# Patient Record
Sex: MALE | Race: WHITE | NOT HISPANIC OR LATINO | Employment: FULL TIME | RURAL
[De-identification: names, ages, dates, MRNs, and addresses within clinical notes are randomized per-mention and may not be internally consistent; named-entity substitution may affect disease eponyms.]

---

## 2022-12-08 ENCOUNTER — OFFICE VISIT (OUTPATIENT)
Dept: FAMILY MEDICINE | Facility: CLINIC | Age: 57
End: 2022-12-08
Payer: COMMERCIAL

## 2022-12-08 VITALS
HEIGHT: 69 IN | TEMPERATURE: 98 F | HEART RATE: 92 BPM | DIASTOLIC BLOOD PRESSURE: 120 MMHG | WEIGHT: 250.38 LBS | OXYGEN SATURATION: 99 % | SYSTOLIC BLOOD PRESSURE: 160 MMHG | BODY MASS INDEX: 37.08 KG/M2

## 2022-12-08 DIAGNOSIS — Z76.89 ENCOUNTER TO ESTABLISH CARE WITH NEW DOCTOR: ICD-10-CM

## 2022-12-08 DIAGNOSIS — M79.672 LEFT FOOT PAIN: Primary | ICD-10-CM

## 2022-12-08 DIAGNOSIS — Z87.39 HISTORY OF GOUT: ICD-10-CM

## 2022-12-08 DIAGNOSIS — R03.0 ELEVATED BLOOD-PRESSURE READING, WITHOUT DIAGNOSIS OF HYPERTENSION: ICD-10-CM

## 2022-12-08 PROCEDURE — 3080F PR MOST RECENT DIASTOLIC BLOOD PRESSURE >= 90 MM HG: ICD-10-PCS | Mod: ,,, | Performed by: NURSE PRACTITIONER

## 2022-12-08 PROCEDURE — 3077F PR MOST RECENT SYSTOLIC BLOOD PRESSURE >= 140 MM HG: ICD-10-PCS | Mod: ,,, | Performed by: NURSE PRACTITIONER

## 2022-12-08 PROCEDURE — 3080F DIAST BP >= 90 MM HG: CPT | Mod: ,,, | Performed by: NURSE PRACTITIONER

## 2022-12-08 PROCEDURE — 3008F PR BODY MASS INDEX (BMI) DOCUMENTED: ICD-10-PCS | Mod: ,,, | Performed by: NURSE PRACTITIONER

## 2022-12-08 PROCEDURE — 3077F SYST BP >= 140 MM HG: CPT | Mod: ,,, | Performed by: NURSE PRACTITIONER

## 2022-12-08 PROCEDURE — 99203 OFFICE O/P NEW LOW 30 MIN: CPT | Mod: ,,, | Performed by: NURSE PRACTITIONER

## 2022-12-08 PROCEDURE — 99203 PR OFFICE/OUTPT VISIT, NEW, LEVL III, 30-44 MIN: ICD-10-PCS | Mod: ,,, | Performed by: NURSE PRACTITIONER

## 2022-12-08 PROCEDURE — 1159F MED LIST DOCD IN RCRD: CPT | Mod: ,,, | Performed by: NURSE PRACTITIONER

## 2022-12-08 PROCEDURE — 3008F BODY MASS INDEX DOCD: CPT | Mod: ,,, | Performed by: NURSE PRACTITIONER

## 2022-12-08 PROCEDURE — 1159F PR MEDICATION LIST DOCUMENTED IN MEDICAL RECORD: ICD-10-PCS | Mod: ,,, | Performed by: NURSE PRACTITIONER

## 2022-12-08 PROCEDURE — 1160F RVW MEDS BY RX/DR IN RCRD: CPT | Mod: ,,, | Performed by: NURSE PRACTITIONER

## 2022-12-08 PROCEDURE — 1160F PR REVIEW ALL MEDS BY PRESCRIBER/CLIN PHARMACIST DOCUMENTED: ICD-10-PCS | Mod: ,,, | Performed by: NURSE PRACTITIONER

## 2022-12-08 RX ORDER — CLINDAMYCIN HYDROCHLORIDE 300 MG/1
300 CAPSULE ORAL 3 TIMES DAILY
Qty: 30 CAPSULE | Refills: 0 | Status: SHIPPED | OUTPATIENT
Start: 2022-12-08 | End: 2022-12-18

## 2022-12-08 RX ORDER — TRAMADOL HYDROCHLORIDE 50 MG/1
TABLET ORAL
Qty: 12 TABLET | Refills: 0 | Status: SHIPPED | OUTPATIENT
Start: 2022-12-08 | End: 2023-03-21 | Stop reason: SDUPTHER

## 2022-12-08 RX ORDER — KETOROLAC TROMETHAMINE 10 MG/1
10 TABLET, FILM COATED ORAL EVERY 6 HOURS
Qty: 20 TABLET | Refills: 0 | Status: SHIPPED | OUTPATIENT
Start: 2022-12-08 | End: 2022-12-13

## 2022-12-08 RX ORDER — DULAGLUTIDE 0.75 MG/.5ML
0.75 INJECTION, SOLUTION SUBCUTANEOUS
COMMUNITY
Start: 2022-09-07 | End: 2023-11-29

## 2022-12-08 NOTE — PROGRESS NOTES
"Subjective:       Patient ID: Davi Talamantes is a 57 y.o. male.    Chief Complaint: Foot Swelling (Left foot pain. Redness and edema. )    Presents to clinic as above. Hx of gout. No fever. No injury.    Review of Systems   Constitutional: Negative.    Respiratory: Negative.     Cardiovascular: Negative.    Musculoskeletal:  Positive for joint pain. Negative for back pain, falls, myalgias and neck pain.        Reviewed family, medical, surgical, and social history.    Objective:      BP (!) 160/120   Pulse 92   Temp 98.3 °F (36.8 °C) (Oral)   Ht 5' 9" (1.753 m)   Wt 113.6 kg (250 lb 6.4 oz)   SpO2 99%   BMI 36.98 kg/m²   Physical Exam  Vitals and nursing note reviewed.   Constitutional:       General: He is not in acute distress.     Appearance: Normal appearance. He is not ill-appearing, toxic-appearing or diaphoretic.   HENT:      Head: Normocephalic.      Mouth/Throat:      Mouth: Mucous membranes are moist.   Cardiovascular:      Rate and Rhythm: Normal rate and regular rhythm.      Heart sounds: Normal heart sounds.   Pulmonary:      Effort: Pulmonary effort is normal.      Breath sounds: Normal breath sounds.   Musculoskeletal:      Cervical back: Normal range of motion and neck supple.      Left foot: Normal range of motion. No deformity, bunion, Charcot foot, foot drop or prominent metatarsal heads.   Feet:      Left foot:      Skin integrity: Erythema and warmth present. No ulcer, blister, skin breakdown, callus, dry skin or fissure.      Toenail Condition: Left toenails are abnormally thick.      Comments: Moderate redness to lateral left foot over the 5th metatarsal. Normal pulses.  Skin:     General: Skin is warm and dry.      Capillary Refill: Capillary refill takes less than 2 seconds.   Neurological:      Mental Status: He is alert and oriented to person, place, and time.   Psychiatric:         Mood and Affect: Mood normal.         Behavior: Behavior normal.         Thought Content: Thought content " normal.         Judgment: Judgment normal.          No results found for any previous visit.      Assessment:       1. Left foot pain    2. Encounter to establish care with new doctor    3. Elevated blood-pressure reading, without diagnosis of hypertension    4. History of gout        Plan:       Left foot pain  -     X-Ray Foot Complete 3 view Left; Future; Expected date: 12/08/2022  -     ketorolac (TORADOL) 10 mg tablet; Take 1 tablet (10 mg total) by mouth every 6 (six) hours. for 5 days  Dispense: 20 tablet; Refill: 0  -     traMADoL (ULTRAM) 50 mg tablet; Take 1-2 po q 6 hours prn left foot pain.  Dispense: 12 tablet; Refill: 0  -     clindamycin (CLEOCIN) 300 MG capsule; Take 1 capsule (300 mg total) by mouth 3 (three) times daily. for 10 days  Dispense: 30 capsule; Refill: 0    Encounter to establish care with new doctor  -     Ambulatory referral/consult to Family Practice; Future; Expected date: 12/15/2022    Elevated blood-pressure reading, without diagnosis of hypertension  -     Ambulatory referral/consult to Family Practice; Future; Expected date: 12/15/2022    History of gout  -     ketorolac (TORADOL) 10 mg tablet; Take 1 tablet (10 mg total) by mouth every 6 (six) hours. for 5 days  Dispense: 20 tablet; Refill: 0  -     traMADoL (ULTRAM) 50 mg tablet; Take 1-2 po q 6 hours prn left foot pain.  Dispense: 12 tablet; Refill: 0  -     clindamycin (CLEOCIN) 300 MG capsule; Take 1 capsule (300 mg total) by mouth 3 (three) times daily. for 10 days  Dispense: 30 capsule; Refill: 0  Covering for possible cellulitis due to hx of diabetes  Go to ER with any fever  RTC PRN          Risks, benefits, and side effects were discussed with the patient. All questions were answered to the fullest satisfaction of the patient, and pt verbalized understanding and agreement to treatment plan. Pt was to call with any new or worsening symptoms, or present to the ER.

## 2022-12-09 ENCOUNTER — TELEPHONE (OUTPATIENT)
Dept: FAMILY MEDICINE | Facility: CLINIC | Age: 57
End: 2022-12-09
Payer: COMMERCIAL

## 2022-12-09 NOTE — TELEPHONE ENCOUNTER
Identify patient by name and .results was given. Responded well and voiced understanding. ----- Message from SHIRA Corey sent at 2022 12:21 PM CST -----  Notify of arthritis and calcaneal spurs. Notify me if does not get better.

## 2023-03-21 ENCOUNTER — OFFICE VISIT (OUTPATIENT)
Dept: INTERNAL MEDICINE | Facility: CLINIC | Age: 58
End: 2023-03-21
Payer: COMMERCIAL

## 2023-03-21 VITALS
WEIGHT: 252 LBS | DIASTOLIC BLOOD PRESSURE: 90 MMHG | SYSTOLIC BLOOD PRESSURE: 148 MMHG | BODY MASS INDEX: 37.33 KG/M2 | RESPIRATION RATE: 18 BRPM | HEART RATE: 67 BPM | OXYGEN SATURATION: 99 % | TEMPERATURE: 97 F | HEIGHT: 69 IN

## 2023-03-21 DIAGNOSIS — Z76.89 ENCOUNTER TO ESTABLISH CARE WITH NEW DOCTOR: Primary | ICD-10-CM

## 2023-03-21 DIAGNOSIS — G47.33 OBSTRUCTIVE SLEEP APNEA: ICD-10-CM

## 2023-03-21 DIAGNOSIS — Z13.1 ENCOUNTER FOR SCREENING FOR DIABETES MELLITUS: ICD-10-CM

## 2023-03-21 DIAGNOSIS — E78.5 DYSLIPIDEMIA: ICD-10-CM

## 2023-03-21 DIAGNOSIS — M79.672 LEFT FOOT PAIN: ICD-10-CM

## 2023-03-21 DIAGNOSIS — E11.9 TYPE 2 DIABETES MELLITUS WITHOUT COMPLICATION, WITHOUT LONG-TERM CURRENT USE OF INSULIN: ICD-10-CM

## 2023-03-21 DIAGNOSIS — Z13.220 ENCOUNTER FOR SCREENING FOR LIPID DISORDER: ICD-10-CM

## 2023-03-21 DIAGNOSIS — Z87.39 HISTORY OF GOUT: ICD-10-CM

## 2023-03-21 DIAGNOSIS — I10 ESSENTIAL HYPERTENSION: ICD-10-CM

## 2023-03-21 PROCEDURE — 99205 PR OFFICE/OUTPT VISIT, NEW, LEVL V, 60-74 MIN: ICD-10-PCS | Mod: S$PBB,,, | Performed by: INTERNAL MEDICINE

## 2023-03-21 PROCEDURE — 3077F PR MOST RECENT SYSTOLIC BLOOD PRESSURE >= 140 MM HG: ICD-10-PCS | Mod: ,,, | Performed by: INTERNAL MEDICINE

## 2023-03-21 PROCEDURE — 3077F SYST BP >= 140 MM HG: CPT | Mod: ,,, | Performed by: INTERNAL MEDICINE

## 2023-03-21 PROCEDURE — 1159F PR MEDICATION LIST DOCUMENTED IN MEDICAL RECORD: ICD-10-PCS | Mod: ,,, | Performed by: INTERNAL MEDICINE

## 2023-03-21 PROCEDURE — 3080F DIAST BP >= 90 MM HG: CPT | Mod: ,,, | Performed by: INTERNAL MEDICINE

## 2023-03-21 PROCEDURE — 99205 OFFICE O/P NEW HI 60 MIN: CPT | Mod: S$PBB,,, | Performed by: INTERNAL MEDICINE

## 2023-03-21 PROCEDURE — 99214 OFFICE O/P EST MOD 30 MIN: CPT | Mod: PBBFAC | Performed by: INTERNAL MEDICINE

## 2023-03-21 PROCEDURE — 3008F PR BODY MASS INDEX (BMI) DOCUMENTED: ICD-10-PCS | Mod: ,,, | Performed by: INTERNAL MEDICINE

## 2023-03-21 PROCEDURE — 3008F BODY MASS INDEX DOCD: CPT | Mod: ,,, | Performed by: INTERNAL MEDICINE

## 2023-03-21 PROCEDURE — 3080F PR MOST RECENT DIASTOLIC BLOOD PRESSURE >= 90 MM HG: ICD-10-PCS | Mod: ,,, | Performed by: INTERNAL MEDICINE

## 2023-03-21 PROCEDURE — 1159F MED LIST DOCD IN RCRD: CPT | Mod: ,,, | Performed by: INTERNAL MEDICINE

## 2023-03-21 RX ORDER — OLMESARTAN MEDOXOMIL 20 MG/1
20 TABLET ORAL DAILY
Qty: 90 TABLET | Refills: 1 | Status: SHIPPED | OUTPATIENT
Start: 2023-03-21 | End: 2023-06-22 | Stop reason: SDUPTHER

## 2023-03-21 RX ORDER — LANCETS 33 GAUGE
EACH MISCELLANEOUS
COMMUNITY
Start: 2023-01-23 | End: 2024-01-02

## 2023-03-21 RX ORDER — TRAMADOL HYDROCHLORIDE 50 MG/1
50 TABLET ORAL EVERY 6 HOURS PRN
Qty: 40 TABLET | Refills: 1 | Status: SHIPPED | OUTPATIENT
Start: 2023-03-21

## 2023-03-21 RX ORDER — BLOOD-GLUCOSE METER
1 EACH MISCELLANEOUS
COMMUNITY
Start: 2022-10-15 | End: 2024-01-02

## 2023-03-21 NOTE — PROGRESS NOTES
Subjective:       Patient ID: Davi Talamantes is a 57 y.o. male.    Chief Complaint: Establish Care (Pt has a wellness form he is needing completed )    The patient is a 58 yo male that presents today to establish [facility]. 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 He has a past medical history of DM II, hypertension, dyslipidemia, LYNNE.  He takes Trulicity for his diabetes.  He states that his sugars tend to run less than 130.  He inquires to know if he can be on any oral medication.  He is having issues with the injections.  He has been on ACE-inhibitor and hydrochlorothiazide in the past for his blood pressure.  However he has not been on any medications for the last 3 months.  His blood pressure is elevated today at 148/90.  He takes his blood pressure home and states using runs around that at home as well.  He does use a CPAP at night.  He has never had a colonoscopy.  Eye exam is up-to-date.  He also complains of some arthralgias and some paresthesias in the right upper extremity that he states is secondary to an accident.  He has seen multiple doctors in the past without much relief.  Today he is resting comfortably in no distress.  He is afebrile and vital signs are stable.    Review of Systems   Constitutional:  Positive for activity change and unexpected weight change. Negative for appetite change, chills and fever.   HENT:  Negative for ear pain, hearing loss, rhinorrhea, sinus pressure/congestion, sore throat and trouble swallowing.    Eyes:  Negative for pain, discharge, redness and visual disturbance.   Respiratory:  Negative for apnea, cough, chest tightness, shortness of breath and wheezing.    Cardiovascular:  Positive for palpitations. Negative for chest pain and leg swelling.   Gastrointestinal:  Negative for abdominal pain, blood in stool, constipation, diarrhea, nausea and vomiting.   Endocrine: Negative for cold intolerance, heat intolerance, polydipsia and  polyuria.   Genitourinary:  Negative for difficulty urinating, dysuria, hematuria and urgency.   Musculoskeletal:  Positive for arthralgias and joint swelling. Negative for back pain, myalgias and neck pain.   Integumentary:  Negative for pallor and rash.   Allergic/Immunologic: Negative for frequent infections.   Neurological:  Positive for weakness. Negative for tremors, seizures and headaches.   Hematological:  Negative for adenopathy.   Psychiatric/Behavioral:  Positive for dysphoric mood. Negative for confusion and sleep disturbance. The patient is not nervous/anxious.        Objective:      Physical Exam  Vitals and nursing note reviewed.   Constitutional:       General: He is not in acute distress.     Appearance: Normal appearance. He is obese. He is not ill-appearing.   HENT:      Head: Normocephalic and atraumatic.      Right Ear: External ear normal.      Left Ear: External ear normal.      Nose: Nose normal.      Mouth/Throat:      Pharynx: Oropharynx is clear.   Eyes:      Extraocular Movements: Extraocular movements intact.      Conjunctiva/sclera: Conjunctivae normal.      Pupils: Pupils are equal, round, and reactive to light.   Neck:      Vascular: No carotid bruit.   Cardiovascular:      Rate and Rhythm: Normal rate and regular rhythm.      Pulses: Normal pulses.      Heart sounds: Normal heart sounds. No murmur heard.  Pulmonary:      Effort: No respiratory distress.      Breath sounds: Normal breath sounds. No wheezing or rales.   Abdominal:      General: Bowel sounds are normal.      Palpations: Abdomen is soft.   Musculoskeletal:         General: Normal range of motion.      Cervical back: Normal range of motion and neck supple.      Right lower leg: No edema.      Left lower leg: No edema.   Skin:     General: Skin is warm and dry.      Capillary Refill: Capillary refill takes less than 2 seconds.      Coloration: Skin is not pale.   Neurological:      General: No focal deficit present.       Mental Status: He is alert and oriented to person, place, and time.      Cranial Nerves: No cranial nerve deficit.      Motor: No weakness.      Gait: Gait normal.   Psychiatric:         Mood and Affect: Mood normal.         Judgment: Judgment normal.       Assessment:       Problem List Items Addressed This Visit          Cardiac/Vascular    Dyslipidemia    Essential hypertension       Endocrine    Type 2 diabetes mellitus without complication, without long-term current use of insulin    Relevant Orders    Microalbumin/Creatinine Ratio, Urine    TSH     Other Visit Diagnoses       Encounter to establish care with new doctor    -  Primary    Encounter for screening for diabetes mellitus        Relevant Orders    Hemoglobin A1C    Encounter for screening for lipid disorder        Relevant Orders    Lipid Panel    Left foot pain        Relevant Medications    traMADoL (ULTRAM) 50 mg tablet    Other Relevant Orders    CBC Auto Differential    Comprehensive Metabolic Panel    History of gout        Relevant Medications    traMADoL (ULTRAM) 50 mg tablet    Obstructive sleep apnea                  Plan:       1. The patient presents today to establish care.  He is never had a colonoscopy.  He is okay with us making referral for colonoscopy.  We are also going to check some lab work today.  We have discussed U/S P TF guidelines for age-appropriate screenings.  No family history of prostate cancer or colon cancer.  Based on most up-to-date guidelines and a joint shared decision and discussion we are not going to check a PSA    2. Diabetes mellitus type 2-currently only on Trulicity.  However he is having some issues tolerating this.  We are going to try Rybelsus and see if he can tolerate this.  We are going to check an A1c today, urine microalbumin.  No foot lesions.  Eye exam has been done within the last year.    3. Essential hypertension-currently not on any medications.  Blood pressure is elevated at 140 8/90.  His  target blood pressures less than 130/85.  I am going to place him on olmesartan 20 mg daily.  This will provide some kidney protection as well.  He is going to return to care in 2 weeks for blood pressure check.  We are going to check a chemistry today to see what his creatinine looks like    4. Dyslipidemia-not currently on any medications.  We are going to check a lipid panel and calculate statin needs    5. Obstructive sleep apnea-he uses CPAP at night    6. Polyarthralgias-secondary to osteoarthritis.  Tramadol p.r.n. and Voltaren gel p.r.n.    We have discussed diet and exercise.  He has a history of depression and was on Wellbutrin in the past but he is no longer on this medication.  We have discussed referral to therapist but he would like to hold off at this time.    Return to care in 3 months

## 2023-03-23 RX ORDER — ATORVASTATIN CALCIUM 20 MG/1
20 TABLET, FILM COATED ORAL DAILY
Qty: 90 TABLET | Refills: 3 | Status: SHIPPED | OUTPATIENT
Start: 2023-03-23 | End: 2023-06-22 | Stop reason: SDUPTHER

## 2023-04-03 ENCOUNTER — PATIENT MESSAGE (OUTPATIENT)
Dept: INTERNAL MEDICINE | Facility: CLINIC | Age: 58
End: 2023-04-03
Payer: COMMERCIAL

## 2023-04-12 ENCOUNTER — CLINICAL SUPPORT (OUTPATIENT)
Dept: INTERNAL MEDICINE | Facility: CLINIC | Age: 58
End: 2023-04-12
Payer: COMMERCIAL

## 2023-04-12 VITALS — SYSTOLIC BLOOD PRESSURE: 126 MMHG | DIASTOLIC BLOOD PRESSURE: 84 MMHG

## 2023-04-12 DIAGNOSIS — I10 ESSENTIAL HYPERTENSION: Primary | ICD-10-CM

## 2023-04-12 PROCEDURE — 99212 OFFICE O/P EST SF 10 MIN: CPT | Mod: PBBFAC

## 2023-04-12 RX ORDER — ORAL SEMAGLUTIDE 7 MG/1
7 TABLET ORAL DAILY
Qty: 90 TABLET | Refills: 3 | Status: SHIPPED | OUTPATIENT
Start: 2023-04-12 | End: 2023-06-22

## 2023-04-12 NOTE — PROGRESS NOTES
Pt here for bp recheck. Bp is good today Dr. Bradshaw aware. Pt advised to call us if he has any issues or needs anything. Pt verbalized understanding.

## 2023-04-12 NOTE — TELEPHONE ENCOUNTER
Pt states he is tolerating the 3mg dose of rybelsus great with no c/o. He is asking for it to be increased now.

## 2023-06-22 ENCOUNTER — OFFICE VISIT (OUTPATIENT)
Dept: INTERNAL MEDICINE | Facility: CLINIC | Age: 58
End: 2023-06-22
Payer: COMMERCIAL

## 2023-06-22 VITALS
SYSTOLIC BLOOD PRESSURE: 134 MMHG | RESPIRATION RATE: 18 BRPM | WEIGHT: 244 LBS | OXYGEN SATURATION: 99 % | TEMPERATURE: 97 F | BODY MASS INDEX: 36.14 KG/M2 | DIASTOLIC BLOOD PRESSURE: 88 MMHG | HEIGHT: 69 IN | HEART RATE: 94 BPM

## 2023-06-22 DIAGNOSIS — E78.5 DYSLIPIDEMIA: ICD-10-CM

## 2023-06-22 DIAGNOSIS — I10 ESSENTIAL HYPERTENSION: ICD-10-CM

## 2023-06-22 DIAGNOSIS — E11.9 TYPE 2 DIABETES MELLITUS WITHOUT COMPLICATION, WITHOUT LONG-TERM CURRENT USE OF INSULIN: ICD-10-CM

## 2023-06-22 DIAGNOSIS — Z09 FOLLOW-UP EXAM: Primary | ICD-10-CM

## 2023-06-22 PROCEDURE — 99214 OFFICE O/P EST MOD 30 MIN: CPT | Mod: S$PBB,,, | Performed by: INTERNAL MEDICINE

## 2023-06-22 PROCEDURE — 99214 PR OFFICE/OUTPT VISIT, EST, LEVL IV, 30-39 MIN: ICD-10-PCS | Mod: S$PBB,,, | Performed by: INTERNAL MEDICINE

## 2023-06-22 PROCEDURE — 4010F ACE/ARB THERAPY RXD/TAKEN: CPT | Mod: ,,, | Performed by: INTERNAL MEDICINE

## 2023-06-22 PROCEDURE — 3061F PR NEG MICROALBUMINURIA RESULT DOCUMENTED/REVIEW: ICD-10-PCS | Mod: ,,, | Performed by: INTERNAL MEDICINE

## 2023-06-22 PROCEDURE — 1159F PR MEDICATION LIST DOCUMENTED IN MEDICAL RECORD: ICD-10-PCS | Mod: ,,, | Performed by: INTERNAL MEDICINE

## 2023-06-22 PROCEDURE — 3075F PR MOST RECENT SYSTOLIC BLOOD PRESS GE 130-139MM HG: ICD-10-PCS | Mod: ,,, | Performed by: INTERNAL MEDICINE

## 2023-06-22 PROCEDURE — 3061F NEG MICROALBUMINURIA REV: CPT | Mod: ,,, | Performed by: INTERNAL MEDICINE

## 2023-06-22 PROCEDURE — 3079F PR MOST RECENT DIASTOLIC BLOOD PRESSURE 80-89 MM HG: ICD-10-PCS | Mod: ,,, | Performed by: INTERNAL MEDICINE

## 2023-06-22 PROCEDURE — 1159F MED LIST DOCD IN RCRD: CPT | Mod: ,,, | Performed by: INTERNAL MEDICINE

## 2023-06-22 PROCEDURE — 3008F BODY MASS INDEX DOCD: CPT | Mod: ,,, | Performed by: INTERNAL MEDICINE

## 2023-06-22 PROCEDURE — 3044F PR MOST RECENT HEMOGLOBIN A1C LEVEL <7.0%: ICD-10-PCS | Mod: ,,, | Performed by: INTERNAL MEDICINE

## 2023-06-22 PROCEDURE — 3008F PR BODY MASS INDEX (BMI) DOCUMENTED: ICD-10-PCS | Mod: ,,, | Performed by: INTERNAL MEDICINE

## 2023-06-22 PROCEDURE — 3075F SYST BP GE 130 - 139MM HG: CPT | Mod: ,,, | Performed by: INTERNAL MEDICINE

## 2023-06-22 PROCEDURE — 3044F HG A1C LEVEL LT 7.0%: CPT | Mod: ,,, | Performed by: INTERNAL MEDICINE

## 2023-06-22 PROCEDURE — 3066F PR DOCUMENTATION OF TREATMENT FOR NEPHROPATHY: ICD-10-PCS | Mod: ,,, | Performed by: INTERNAL MEDICINE

## 2023-06-22 PROCEDURE — 3066F NEPHROPATHY DOC TX: CPT | Mod: ,,, | Performed by: INTERNAL MEDICINE

## 2023-06-22 PROCEDURE — 99214 OFFICE O/P EST MOD 30 MIN: CPT | Mod: PBBFAC | Performed by: INTERNAL MEDICINE

## 2023-06-22 PROCEDURE — 4010F PR ACE/ARB THEARPY RXD/TAKEN: ICD-10-PCS | Mod: ,,, | Performed by: INTERNAL MEDICINE

## 2023-06-22 PROCEDURE — 3079F DIAST BP 80-89 MM HG: CPT | Mod: ,,, | Performed by: INTERNAL MEDICINE

## 2023-06-22 RX ORDER — BENZONATATE 100 MG/1
CAPSULE ORAL
COMMUNITY
Start: 2023-06-15

## 2023-06-22 RX ORDER — ATORVASTATIN CALCIUM 20 MG/1
20 TABLET, FILM COATED ORAL DAILY
Qty: 90 TABLET | Refills: 3 | Status: SHIPPED | OUTPATIENT
Start: 2023-06-22 | End: 2024-06-21

## 2023-06-22 RX ORDER — OLMESARTAN MEDOXOMIL 20 MG/1
20 TABLET ORAL DAILY
Qty: 90 TABLET | Refills: 3 | Status: SHIPPED | OUTPATIENT
Start: 2023-06-22 | End: 2024-06-21

## 2023-06-22 NOTE — PROGRESS NOTES
Subjective:       Patient ID: Davi Talamantes is a 57 y.o. male.    Chief Complaint: Follow-up (No complaints today)    The patient is a 58 yo male that presents today to establish care. 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  He takes Trulicity for his diabetes.  He states that his sugars tend to run less than 130.  He inquires to know if he can be on any oral medication.  He is having issues with the injections.  He has been on ACE-inhibitor and hydrochlorothiazide in the past for his blood pressure.  However he has not been on any medications for the last 3 months.  His blood pressure is elevated today at 148/90.  He takes his blood pressure home and states using runs around that at home as well.  He does use a CPAP at night.  He has never had a colonoscopy.  Eye exam is up-to-date.  He also complains of some arthralgias and some paresthesias in the right upper extremity that he states is secondary to an accident.  He has seen multiple doctors in the past without much relief.  Today he is resting comfortably in no distress.  He is afebrile and vital signs are stable.    6/22/23 Patient presents today for follow-up.  No significant changes in his health since we last saw him He reports blood pressures have been running 130s/80s since time of last visit, BP today is 134/88. He said that he has a smart watch that checks his blood pressure and blood sugars. BS having been ranging 110-190s. He was started on Rybelsus in March and says he is tolerating it well.  He is just on a 7 mg dose.  Denies any other complaints at this time. He does not appear to be in acute distress today. VS are stable.     Follow-up  Associated symptoms include arthralgias. Pertinent negatives include no abdominal pain, chest pain, chills, coughing, fever, headaches, joint swelling, myalgias, nausea, neck pain, rash, sore throat,  vomiting or weakness.   Review of Systems   Constitutional:  Negative for activity change, appetite change, chills, fever and unexpected weight change.   HENT:  Negative for ear pain, hearing loss, rhinorrhea, sinus pressure/congestion, sore throat and trouble swallowing.    Eyes:  Negative for pain, discharge, redness and visual disturbance.   Respiratory:  Negative for apnea, cough, chest tightness, shortness of breath and wheezing.    Cardiovascular:  Negative for chest pain, palpitations and leg swelling.   Gastrointestinal:  Negative for abdominal pain, blood in stool, constipation, diarrhea, nausea and vomiting.   Endocrine: Negative for cold intolerance, heat intolerance, polydipsia and polyuria.   Genitourinary:  Negative for difficulty urinating, dysuria, hematuria and urgency.   Musculoskeletal:  Positive for arthralgias. Negative for back pain, joint swelling, myalgias and neck pain.   Integumentary:  Negative for pallor and rash.   Allergic/Immunologic: Negative for frequent infections.   Neurological:  Negative for tremors, seizures, weakness and headaches.   Hematological:  Negative for adenopathy.   Psychiatric/Behavioral:  Negative for confusion, dysphoric mood and sleep disturbance. The patient is not nervous/anxious.        Objective:      Physical Exam  Vitals and nursing note reviewed.   Constitutional:       General: He is not in acute distress.     Appearance: Normal appearance. He is obese. He is not ill-appearing.   HENT:      Head: Normocephalic and atraumatic.      Right Ear: External ear normal.      Left Ear: External ear normal.      Nose: Nose normal.      Mouth/Throat:      Pharynx: Oropharynx is clear.   Eyes:      Extraocular Movements: Extraocular movements intact.      Conjunctiva/sclera: Conjunctivae normal.      Pupils: Pupils are equal, round, and reactive to light.   Neck:      Vascular: No carotid bruit.   Cardiovascular:      Rate and Rhythm: Normal rate and regular rhythm.       Pulses: Normal pulses.      Heart sounds: Normal heart sounds. No murmur heard.  Pulmonary:      Effort: No respiratory distress.      Breath sounds: Normal breath sounds. No wheezing or rales.   Abdominal:      General: Bowel sounds are normal.      Palpations: Abdomen is soft.   Musculoskeletal:         General: Normal range of motion.      Cervical back: Normal range of motion and neck supple.      Right lower leg: No edema.      Left lower leg: No edema.   Skin:     General: Skin is warm and dry.      Capillary Refill: Capillary refill takes less than 2 seconds.      Coloration: Skin is not pale.   Neurological:      General: No focal deficit present.      Mental Status: He is alert and oriented to person, place, and time.      Cranial Nerves: No cranial nerve deficit.      Motor: No weakness.      Gait: Gait normal.   Psychiatric:         Mood and Affect: Mood normal.         Judgment: Judgment normal.       Assessment:       Problem List Items Addressed This Visit    None  Visit Diagnoses       Follow-up exam    -  Primary              Plan:       1. The patient presents today for follow-up.  We have made referral for colonoscopy.  Lab work was done back in March.    2. Diabetes mellitus type 2-currently only on Trulicity.  However he is having some issues tolerating this.  We are going to try Rybelsus and see if he can tolerate this.  We are going to check an A1c today, urine microalbumin.  No foot lesions.  Eye exam has been done within the last year.  6/22 Tolerating Rybelsus well. BS ranging 110-190s. Will check A1c today.  If needed we can increase thrombosis to 14 mg    3. Essential hypertension-currently not on any medications.  Blood pressure is elevated at 140 8/90.  His target blood pressures less than 130/85.  I am going to place him on olmesartan 20 mg daily.  This will provide some kidney protection as well.  He is going to return to care in 2 weeks for blood pressure check.  We are going to  check a chemistry today to see what his creatinine looks like  6/22 BP is 134/88 today.  We will continue with current care    4. Dyslipidemia-not currently on any medications.  We are going to check a lipid panel and calculate statin needs.  6/22 He is now on Atorvastatin 20mg.  This was started following his last visit    5. Obstructive sleep apnea-he uses CPAP at night.    6. Polyarthralgias-secondary to osteoarthritis.  Tramadol p.r.n. and Voltaren gel p.r.n.    We have discussed diet and exercise.  He has a history of depression and was on Wellbutrin in the past but he is no longer on this medication.  We have discussed referral to therapist but he would like to hold off at this time.  He is doing better from a mood standpoint.  He has also lost about 8 lb.    Return to care in 6 months

## 2023-08-22 ENCOUNTER — PATIENT MESSAGE (OUTPATIENT)
Dept: INTERNAL MEDICINE | Facility: CLINIC | Age: 58
End: 2023-08-22
Payer: COMMERCIAL

## 2023-08-23 DIAGNOSIS — R10.9 ABDOMINAL PAIN, UNSPECIFIED ABDOMINAL LOCATION: Primary | ICD-10-CM

## 2023-09-06 ENCOUNTER — HOSPITAL ENCOUNTER (OUTPATIENT)
Dept: RADIOLOGY | Facility: HOSPITAL | Age: 58
Discharge: HOME | End: 2023-09-06
Attending: INTERNAL MEDICINE
Payer: COMMERCIAL

## 2023-09-06 DIAGNOSIS — R10.9 ABDOMINAL PAIN, UNSPECIFIED ABDOMINAL LOCATION: ICD-10-CM

## 2023-09-06 PROCEDURE — 76700 US EXAM ABDOM COMPLETE: CPT | Mod: TC

## 2023-09-06 PROCEDURE — 76700 US ABDOMEN COMPLETE: ICD-10-PCS | Mod: 26,,, | Performed by: STUDENT IN AN ORGANIZED HEALTH CARE EDUCATION/TRAINING PROGRAM

## 2023-09-06 PROCEDURE — 76700 US EXAM ABDOM COMPLETE: CPT | Mod: 26,,, | Performed by: STUDENT IN AN ORGANIZED HEALTH CARE EDUCATION/TRAINING PROGRAM

## 2023-10-09 ENCOUNTER — PATIENT MESSAGE (OUTPATIENT)
Dept: INTERNAL MEDICINE | Facility: CLINIC | Age: 58
End: 2023-10-09
Payer: COMMERCIAL

## 2023-10-09 ENCOUNTER — HOSPITAL ENCOUNTER (EMERGENCY)
Facility: HOSPITAL | Age: 58
Discharge: HOME | End: 2023-10-09
Payer: COMMERCIAL

## 2023-10-09 VITALS
RESPIRATION RATE: 19 BRPM | BODY MASS INDEX: 34.66 KG/M2 | TEMPERATURE: 99 F | HEIGHT: 69 IN | SYSTOLIC BLOOD PRESSURE: 139 MMHG | WEIGHT: 234 LBS | HEART RATE: 114 BPM | DIASTOLIC BLOOD PRESSURE: 84 MMHG | OXYGEN SATURATION: 97 %

## 2023-10-09 DIAGNOSIS — M25.569 KNEE PAIN: ICD-10-CM

## 2023-10-09 DIAGNOSIS — M25.461 EFFUSION OF RIGHT KNEE: Primary | ICD-10-CM

## 2023-10-09 DIAGNOSIS — M25.469 EFFUSION OF KNEE, UNSPECIFIED LATERALITY: Primary | ICD-10-CM

## 2023-10-09 PROCEDURE — 99284 EMERGENCY DEPT VISIT MOD MDM: CPT

## 2023-10-09 PROCEDURE — 29505 APPLICATION LONG LEG SPLINT: CPT | Mod: RT

## 2023-10-09 PROCEDURE — 99283 EMERGENCY DEPT VISIT LOW MDM: CPT | Mod: ,,, | Performed by: NURSE PRACTITIONER

## 2023-10-09 PROCEDURE — 63600175 PHARM REV CODE 636 W HCPCS: Performed by: NURSE PRACTITIONER

## 2023-10-09 PROCEDURE — 96372 THER/PROPH/DIAG INJ SC/IM: CPT | Performed by: NURSE PRACTITIONER

## 2023-10-09 PROCEDURE — 99283 PR EMERGENCY DEPT VISIT,LEVEL III: ICD-10-PCS | Mod: ,,, | Performed by: NURSE PRACTITIONER

## 2023-10-09 RX ORDER — MELOXICAM 7.5 MG/1
7.5 TABLET ORAL DAILY
Qty: 30 TABLET | Refills: 0 | Status: SHIPPED | OUTPATIENT
Start: 2023-10-09 | End: 2023-11-08

## 2023-10-09 RX ORDER — KETOROLAC TROMETHAMINE 30 MG/ML
60 INJECTION, SOLUTION INTRAMUSCULAR; INTRAVENOUS
Status: COMPLETED | OUTPATIENT
Start: 2023-10-09 | End: 2023-10-09

## 2023-10-09 RX ADMIN — KETOROLAC TROMETHAMINE 60 MG: 30 INJECTION, SOLUTION INTRAMUSCULAR at 06:10

## 2023-10-09 NOTE — Clinical Note
"Davi Gamboa" Albin was seen and treated in our emergency department on 10/9/2023.  He may return to work on 10/13/2023.       If you have any questions or concerns, please don't hesitate to call.      Lolis Dennison, FNP"

## 2023-10-09 NOTE — DISCHARGE INSTRUCTIONS
Take medication as prescribed.   Follow up with Ortho clinic with Dr Asif. Call office to schedule appointment.   Wear knee immobilizer for comfort. Use crutches for non-weight bearing.    Rest, ice, and elevate right knee for comfort.   Return to Er with new or worsening symptoms.

## 2023-10-11 ENCOUNTER — OFFICE VISIT (OUTPATIENT)
Dept: ORTHOPEDICS | Facility: CLINIC | Age: 58
End: 2023-10-11
Payer: COMMERCIAL

## 2023-10-11 DIAGNOSIS — M25.561 RIGHT KNEE PAIN, UNSPECIFIED CHRONICITY: Primary | ICD-10-CM

## 2023-10-11 DIAGNOSIS — M10.9 GOUT, UNSPECIFIED CAUSE, UNSPECIFIED CHRONICITY, UNSPECIFIED SITE: ICD-10-CM

## 2023-10-11 DIAGNOSIS — M25.469 EFFUSION OF KNEE, UNSPECIFIED LATERALITY: ICD-10-CM

## 2023-10-11 PROCEDURE — 3066F NEPHROPATHY DOC TX: CPT | Mod: ,,, | Performed by: ORTHOPAEDIC SURGERY

## 2023-10-11 PROCEDURE — 3066F PR DOCUMENTATION OF TREATMENT FOR NEPHROPATHY: ICD-10-PCS | Mod: ,,, | Performed by: ORTHOPAEDIC SURGERY

## 2023-10-11 PROCEDURE — 20610 DRAIN/INJ JOINT/BURSA W/O US: CPT | Mod: S$PBB,RT,, | Performed by: ORTHOPAEDIC SURGERY

## 2023-10-11 PROCEDURE — 99204 PR OFFICE/OUTPT VISIT, NEW, LEVL IV, 45-59 MIN: ICD-10-PCS | Mod: S$PBB,25,, | Performed by: ORTHOPAEDIC SURGERY

## 2023-10-11 PROCEDURE — 3061F NEG MICROALBUMINURIA REV: CPT | Mod: ,,, | Performed by: ORTHOPAEDIC SURGERY

## 2023-10-11 PROCEDURE — 20610 PR DRAIN/INJECT LARGE JOINT/BURSA: ICD-10-PCS | Mod: S$PBB,RT,, | Performed by: ORTHOPAEDIC SURGERY

## 2023-10-11 PROCEDURE — 99999PBSHW PR PBB SHADOW TECHNICAL ONLY FILED TO HB: Mod: PBBFAC,,,

## 2023-10-11 PROCEDURE — 87075 CULTURE, ANAEROBE: ICD-10-PCS | Mod: ,,, | Performed by: CLINICAL MEDICAL LABORATORY

## 2023-10-11 PROCEDURE — 1159F PR MEDICATION LIST DOCUMENTED IN MEDICAL RECORD: ICD-10-PCS | Mod: ,,, | Performed by: ORTHOPAEDIC SURGERY

## 2023-10-11 PROCEDURE — 1160F PR REVIEW ALL MEDS BY PRESCRIBER/CLIN PHARMACIST DOCUMENTED: ICD-10-PCS | Mod: ,,, | Performed by: ORTHOPAEDIC SURGERY

## 2023-10-11 PROCEDURE — 87070 CULTURE OTHR SPECIMN AEROBIC: CPT | Mod: ,,, | Performed by: CLINICAL MEDICAL LABORATORY

## 2023-10-11 PROCEDURE — 1160F RVW MEDS BY RX/DR IN RCRD: CPT | Mod: ,,, | Performed by: ORTHOPAEDIC SURGERY

## 2023-10-11 PROCEDURE — 87070 CULTURE, BODY FLUID: ICD-10-PCS | Mod: ,,, | Performed by: CLINICAL MEDICAL LABORATORY

## 2023-10-11 PROCEDURE — 87075 CULTR BACTERIA EXCEPT BLOOD: CPT | Mod: ,,, | Performed by: CLINICAL MEDICAL LABORATORY

## 2023-10-11 PROCEDURE — 4010F PR ACE/ARB THEARPY RXD/TAKEN: ICD-10-PCS | Mod: ,,, | Performed by: ORTHOPAEDIC SURGERY

## 2023-10-11 PROCEDURE — 3061F PR NEG MICROALBUMINURIA RESULT DOCUMENTED/REVIEW: ICD-10-PCS | Mod: ,,, | Performed by: ORTHOPAEDIC SURGERY

## 2023-10-11 PROCEDURE — 20610 DRAIN/INJ JOINT/BURSA W/O US: CPT | Mod: PBBFAC | Performed by: ORTHOPAEDIC SURGERY

## 2023-10-11 PROCEDURE — 3044F HG A1C LEVEL LT 7.0%: CPT | Mod: ,,, | Performed by: ORTHOPAEDIC SURGERY

## 2023-10-11 PROCEDURE — 3044F PR MOST RECENT HEMOGLOBIN A1C LEVEL <7.0%: ICD-10-PCS | Mod: ,,, | Performed by: ORTHOPAEDIC SURGERY

## 2023-10-11 PROCEDURE — 4010F ACE/ARB THERAPY RXD/TAKEN: CPT | Mod: ,,, | Performed by: ORTHOPAEDIC SURGERY

## 2023-10-11 PROCEDURE — 1159F MED LIST DOCD IN RCRD: CPT | Mod: ,,, | Performed by: ORTHOPAEDIC SURGERY

## 2023-10-11 PROCEDURE — 99204 OFFICE O/P NEW MOD 45 MIN: CPT | Mod: S$PBB,25,, | Performed by: ORTHOPAEDIC SURGERY

## 2023-10-11 PROCEDURE — 99999PBSHW PR PBB SHADOW TECHNICAL ONLY FILED TO HB: ICD-10-PCS | Mod: PBBFAC,,,

## 2023-10-11 PROCEDURE — 99213 OFFICE O/P EST LOW 20 MIN: CPT | Mod: PBBFAC,25 | Performed by: ORTHOPAEDIC SURGERY

## 2023-10-11 RX ORDER — TRIAMCINOLONE ACETONIDE 40 MG/ML
40 INJECTION, SUSPENSION INTRA-ARTICULAR; INTRAMUSCULAR
Status: COMPLETED | OUTPATIENT
Start: 2023-10-11 | End: 2023-10-11

## 2023-10-11 RX ORDER — BUPIVACAINE HYDROCHLORIDE 2.5 MG/ML
1 INJECTION, SOLUTION INFILTRATION; PERINEURAL
Status: COMPLETED | OUTPATIENT
Start: 2023-10-11 | End: 2023-10-11

## 2023-10-11 RX ADMIN — TRIAMCINOLONE ACETONIDE 40 MG: 40 INJECTION, SUSPENSION INTRA-ARTICULAR; INTRAMUSCULAR at 03:10

## 2023-10-11 RX ADMIN — BUPIVACAINE HYDROCHLORIDE 2.5 MG: 2.5 INJECTION, SOLUTION INFILTRATION; PERINEURAL at 03:10

## 2023-10-11 NOTE — PROGRESS NOTES
CLINIC NOTE       Chief Complaint   Patient presents with    Right Knee - Pain        Davi Talamantes is a 58 y.o. male seen today for evaluation of right knee pain/swelling.  He awoke with pain and effusion in his right knee Saturday morning (4 days ago).  The next day while attempting to go to [employer] he he would severe pain and difficulty walking and driving the car.  Presented to [facility] emergency room.  X-rays there revealed a tiny osteophyte emanating from the medial femoral condyle and patella on sunrise view.  She was placed in a knee immobilizer and referred for orthopedic consultation.  He is employed as the past for the [email or web address].  He has a positive history of gout but is not taking any preventative medications.  He states he has been given a diet sheet in the past.  He is not had his serum uric acid checked recently.    History reviewed. No pertinent past medical history.  Family History   Problem Relation Age of Onset    Cancer Mother         Ovarian Cancer    Diabetes Mother     Stroke Mother     Arthritis Father     Depression Father     Alcohol abuse Maternal Grandfather     Heart disease Maternal Grandmother     Stroke Maternal Grandmother     Asthma Daughter     Birth defects Daughter         Down Syndrome w/VSD    Early death Daughter     Learning disabilities Daughter         Down Syndrome    Mental retardation Daughter     Cancer Sister     Diabetes Sister      Current Outpatient Medications on File Prior to Visit   Medication Sig Dispense Refill    atorvastatin (LIPITOR) 20 MG tablet Take 1 tablet (20 mg total) by mouth once daily. 90 tablet 3    benzonatate (TESSALON) 100 MG capsule       meloxicam (MOBIC) 7.5 MG tablet Take 1 tablet (7.5 mg total) by mouth once daily. 30 tablet 0    olmesartan (BENICAR) 20 MG tablet Take 1 tablet (20 mg total) by mouth once daily. 90 tablet 3    ONETOUCH DELICA PLUS LANCET 30 gauge Misc       ONETOUCH VERIO TEST STRIPS Strp 1 strip.      semaglutide  (RYBELSUS) 14 mg tablet Take 1 tablet (14 mg total) by mouth once daily. 90 tablet 3    traMADoL (ULTRAM) 50 mg tablet Take 1 tablet (50 mg total) by mouth every 6 (six) hours as needed for Pain. 40 tablet 1    TRULICITY 0.75 mg/0.5 mL pen injector Inject 0.75 mg into the skin every 7 days.       No current facility-administered medications on file prior to visit.       ROS     There were no vitals filed for this visit.    Past Surgical History:   Procedure Laterality Date    TONSILLECTOMY  1972        Review of patient's allergies indicates:  No Known Allergies     Ortho Exam :  Well-developed well-nourished  male no acute distress.  Alert oriented cooperative.  Neck is supple without JVD.  Breathing is regular nonlabored.  Skin is warm and dry no lesions seen.  Exam of the right knee shows large intra-articular effusion.  Knee ligaments stable clinically.  No erythema or other signs of infection.  Radiographic Examination:    Technique:    Findings:    Impression:   See Above    Assessment and Plan  Patient Active Problem List    Diagnosis Date Noted    Type 2 diabetes mellitus without complication, without long-term current use of insulin 03/21/2023    Dyslipidemia 03/21/2023    Essential hypertension 03/21/2023    Impression:  Probable gout flare-right knee   Plan: Right knee was prepped with Betadine and arthrocentesis performed yielding 35 cc of green tinged xanthochromic fluid.  1 cc eat of triamcinolone and Marcaine were injected into the knee The aspirate was sent for cell count with differential, crystal analysis, aerobic and anaerobic cultures.  He will be sent to the lab for serum uric acid level determination.  Continue Mobic 15 mg 1 p.o. q.d..  Purine diet sheet issued.  Ice therapy recommended.  Call him tomorrow with serum uric acid results.      Hari Livingston M.D.

## 2023-10-14 LAB
BACTERIA SPEC ANAEROBE CULT: NORMAL
BACTERIA SPEC BFLD CULT: NORMAL

## 2023-11-13 NOTE — ED PROVIDER NOTES
Encounter Date: 10/9/2023       History     Chief Complaint   Patient presents with    Knee Pain     Patient presents to the ER with complaint of right knee pain and edema.  Patient reports symptoms started proximally 3 days ago denies recent injury or accident.  He states the swelling makes it painful to bear weight on his right lower extremity.    The history is provided by the patient. No  was used.     Review of patient's allergies indicates:  No Known Allergies  No past medical history on file.  Past Surgical History:   Procedure Laterality Date    TONSILLECTOMY  1972     Family History   Problem Relation Age of Onset    Cancer Mother         Ovarian Cancer    Diabetes Mother     Stroke Mother     Arthritis Father     Depression Father     Alcohol abuse Maternal Grandfather     Heart disease Maternal Grandmother     Stroke Maternal Grandmother     Asthma Daughter     Birth defects Daughter         Down Syndrome w/VSD    Early death Daughter     Learning disabilities Daughter         Down Syndrome    Intellectual disability Daughter     Cancer Sister     Diabetes Sister      Social History     Tobacco Use    Smoking status: Former     Current packs/day: 0.00     Types: Cigarettes     Start date: 10/18/2002     Quit date: 2004     Years since quittin.4     Passive exposure: Never    Smokeless tobacco: Never    Tobacco comments:     Crutch when wife . Stopped cold turkey   Substance Use Topics    Alcohol use: Not Currently    Drug use: Never     Review of Systems   Constitutional:  Positive for activity change.   Musculoskeletal:  Positive for arthralgias, joint swelling and myalgias.        Right knee pain and edema   All other systems reviewed and are negative.      Physical Exam     Initial Vitals [10/09/23 1541]   BP Pulse Resp Temp SpO2   139/84 (!) 124 19 99.1 °F (37.3 °C) 97 %      MAP       --         Physical Exam    Nursing note and vitals reviewed.  Constitutional: He  appears well-developed and well-nourished.   HENT:   Head: Normocephalic.   Nose: Nose normal.   Mouth/Throat: Oropharynx is clear and moist.   Eyes: EOM are normal.   Neck:   Normal range of motion.  Cardiovascular:  Normal rate, normal heart sounds and intact distal pulses.           Pulmonary/Chest: Breath sounds normal.   Abdominal: Bowel sounds are normal.   Musculoskeletal:         General: Tenderness and edema present.      Cervical back: Normal range of motion.      Comments: Right knee edema limited range of motion due to pain     Neurological: He is alert and oriented to person, place, and time. He has normal strength. GCS score is 15. GCS eye subscore is 4. GCS verbal subscore is 5. GCS motor subscore is 6.   Skin: Skin is warm and dry. Capillary refill takes less than 2 seconds.   Psychiatric: He has a normal mood and affect. His behavior is normal. Judgment and thought content normal.         Medical Screening Exam   See Full Note    ED Course   Procedures  Labs Reviewed - No data to display       Imaging Results              X-Ray Knee 3 View Right (Final result)  Result time 10/09/23 16:58:13      Final result by Hari Washington MD (10/09/23 16:58:13)                   Impression:      Joint effusion.  No fracture or dislocation.      Electronically signed by: Hari Washington  Date:    10/09/2023  Time:    16:58               Narrative:    EXAMINATION:  XR KNEE 3 VIEW RIGHT    CLINICAL HISTORY:  Pain in unspecified knee    TECHNIQUE:  AP, lateral, and Merchant views of the right knee were performed.    COMPARISON:  None    FINDINGS:  No fracture.  No dislocation.  There is a suprapatellar joint effusion seen.  Tricompartmental osteophyte formation.                                       Medications   ketorolac injection 60 mg (60 mg Intramuscular Given 10/9/23 4485)     Medical Decision Making  Patient presents to the ER with complaint of right knee pain and edema.  Patient reports symptoms started proximally  3 days ago denies recent injury or accident.  He states the swelling makes it painful to bear weight on his right lower extremity.      Amount and/or Complexity of Data Reviewed  Radiology: ordered. Decision-making details documented in ED Course.  Discussion of management or test interpretation with external provider(s): Toradol 60 mg IM to treat right knee pain  Knee immobilizer and crutches for comfort.  Patient was discharged home with diagnosis of effusion of right knee and knee pain.  He was given referral to follow up in orthopedic clinic with Dr. Asif.  He was instructed to call office to schedule appointment.  He was given prescription for meloxicam to take daily for inflammation and pain.  He was told to rest ice and elevate his extremity for comfort.  He was told to return to the ER with new or worsening symptoms.    Risk  Prescription drug management.                               Clinical Impression:   Final diagnoses:  [M25.569] Knee pain  [M25.461] Effusion of right knee (Primary)        ED Disposition Condition    Discharge Stable          ED Prescriptions       Medication Sig Dispense Start Date End Date Auth. Provider    meloxicam (MOBIC) 7.5 MG tablet () Take 1 tablet (7.5 mg total) by mouth once daily. 30 tablet 10/9/2023 2023 Lolis Dennison, SHIRA          Follow-up Information    None          Lolis Dennison FNP  [phone]

## 2023-12-02 ENCOUNTER — PATIENT MESSAGE (OUTPATIENT)
Dept: ADMINISTRATIVE | Facility: HOSPITAL | Age: 58
End: 2023-12-02

## 2023-12-03 DIAGNOSIS — Z12.11 SCREENING FOR COLON CANCER: ICD-10-CM

## 2024-01-02 ENCOUNTER — OFFICE VISIT (OUTPATIENT)
Dept: INTERNAL MEDICINE | Facility: CLINIC | Age: 59
End: 2024-01-02
Payer: COMMERCIAL

## 2024-01-02 VITALS
RESPIRATION RATE: 18 BRPM | HEART RATE: 102 BPM | DIASTOLIC BLOOD PRESSURE: 82 MMHG | SYSTOLIC BLOOD PRESSURE: 126 MMHG | HEIGHT: 69 IN | WEIGHT: 242 LBS | OXYGEN SATURATION: 99 % | BODY MASS INDEX: 35.84 KG/M2 | TEMPERATURE: 96 F

## 2024-01-02 DIAGNOSIS — M10.9 GOUT, UNSPECIFIED CAUSE, UNSPECIFIED CHRONICITY, UNSPECIFIED SITE: ICD-10-CM

## 2024-01-02 DIAGNOSIS — E78.5 DYSLIPIDEMIA: ICD-10-CM

## 2024-01-02 DIAGNOSIS — Z09 FOLLOW-UP EXAM: Primary | ICD-10-CM

## 2024-01-02 DIAGNOSIS — E11.9 TYPE 2 DIABETES MELLITUS WITHOUT COMPLICATION, WITHOUT LONG-TERM CURRENT USE OF INSULIN: ICD-10-CM

## 2024-01-02 DIAGNOSIS — I10 ESSENTIAL HYPERTENSION: ICD-10-CM

## 2024-01-02 DIAGNOSIS — R20.0 RIGHT SIDED NUMBNESS: ICD-10-CM

## 2024-01-02 PROCEDURE — 99999PBSHW PR PBB SHADOW TECHNICAL ONLY FILED TO HB: Mod: PBBFAC,,,

## 2024-01-02 PROCEDURE — 3074F SYST BP LT 130 MM HG: CPT | Mod: ,,, | Performed by: INTERNAL MEDICINE

## 2024-01-02 PROCEDURE — 90686 IIV4 VACC NO PRSV 0.5 ML IM: CPT | Mod: PBBFAC | Performed by: INTERNAL MEDICINE

## 2024-01-02 PROCEDURE — 96372 THER/PROPH/DIAG INJ SC/IM: CPT | Mod: PBBFAC,59 | Performed by: INTERNAL MEDICINE

## 2024-01-02 PROCEDURE — 99215 OFFICE O/P EST HI 40 MIN: CPT | Mod: PBBFAC | Performed by: INTERNAL MEDICINE

## 2024-01-02 PROCEDURE — 3079F DIAST BP 80-89 MM HG: CPT | Mod: ,,, | Performed by: INTERNAL MEDICINE

## 2024-01-02 PROCEDURE — 3008F BODY MASS INDEX DOCD: CPT | Mod: ,,, | Performed by: INTERNAL MEDICINE

## 2024-01-02 PROCEDURE — 99999PBSHW FLU VACCINE (QUAD) GREATER THAN OR EQUAL TO 3YO PRESERVATIVE FREE IM: Mod: PBBFAC,,,

## 2024-01-02 PROCEDURE — 99215 OFFICE O/P EST HI 40 MIN: CPT | Mod: S$PBB,25,, | Performed by: INTERNAL MEDICINE

## 2024-01-02 PROCEDURE — 1159F MED LIST DOCD IN RCRD: CPT | Mod: ,,, | Performed by: INTERNAL MEDICINE

## 2024-01-02 RX ORDER — ALLOPURINOL 100 MG/1
100 TABLET ORAL DAILY
Qty: 90 TABLET | Refills: 1 | Status: SHIPPED | OUTPATIENT
Start: 2024-01-02

## 2024-01-02 RX ORDER — KETOROLAC TROMETHAMINE 30 MG/ML
60 INJECTION, SOLUTION INTRAMUSCULAR; INTRAVENOUS
Status: COMPLETED | OUTPATIENT
Start: 2024-01-02 | End: 2024-01-02

## 2024-01-02 RX ADMIN — KETOROLAC TROMETHAMINE 60 MG: 30 INJECTION, SOLUTION INTRAMUSCULAR at 08:01
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
## 2024-01-03 ENCOUNTER — PATIENT MESSAGE (OUTPATIENT)
Dept: INTERNAL MEDICINE | Facility: CLINIC | Age: 59
End: 2024-01-03
Payer: COMMERCIAL

## 2024-01-03 ENCOUNTER — PATIENT MESSAGE (OUTPATIENT)
Dept: ADMINISTRATIVE | Facility: HOSPITAL | Age: 59
End: 2024-01-03

## 2024-01-08 ENCOUNTER — PATIENT MESSAGE (OUTPATIENT)
Dept: INTERNAL MEDICINE | Facility: CLINIC | Age: 59
End: 2024-01-08
Payer: COMMERCIAL

## 2024-01-08 RX ORDER — ALLOPURINOL 300 MG/1
300 TABLET ORAL DAILY
Qty: 90 TABLET | Refills: 3 | Status: SHIPPED | OUTPATIENT
Start: 2024-01-08 | End: 2025-01-07

## 2024-01-08 RX ORDER — PREDNISONE 20 MG/1
20 TABLET ORAL DAILY
Qty: 10 TABLET | Refills: 0 | Status: SHIPPED | OUTPATIENT
Start: 2024-01-08 | End: 2024-01-18

## 2024-01-09 LAB — HEMOCCULT STL QL IA: NEGATIVE

## 2024-01-26 ENCOUNTER — PATIENT OUTREACH (OUTPATIENT)
Dept: ADMINISTRATIVE | Facility: HOSPITAL | Age: 59
End: 2024-01-26

## 2024-01-31 ENCOUNTER — PATIENT MESSAGE (OUTPATIENT)
Dept: INTERNAL MEDICINE | Facility: CLINIC | Age: 59
End: 2024-01-31
Payer: COMMERCIAL

## 2024-01-31 DIAGNOSIS — M65.30 TRIGGER FINGER OF RIGHT HAND, UNSPECIFIED FINGER: Primary | ICD-10-CM

## 2024-02-13 ENCOUNTER — OFFICE VISIT (OUTPATIENT)
Dept: ORTHOPEDICS | Facility: CLINIC | Age: 59
End: 2024-02-13
Payer: COMMERCIAL

## 2024-02-13 DIAGNOSIS — M25.561 RIGHT KNEE PAIN, UNSPECIFIED CHRONICITY: Primary | ICD-10-CM

## 2024-02-13 DIAGNOSIS — M65.30 TRIGGER FINGER OF RIGHT HAND, UNSPECIFIED FINGER: ICD-10-CM

## 2024-02-13 PROCEDURE — 3044F HG A1C LEVEL LT 7.0%: CPT | Mod: ,,, | Performed by: ORTHOPAEDIC SURGERY

## 2024-02-13 PROCEDURE — 99214 OFFICE O/P EST MOD 30 MIN: CPT | Mod: S$PBB,,, | Performed by: ORTHOPAEDIC SURGERY

## 2024-02-13 PROCEDURE — 1159F MED LIST DOCD IN RCRD: CPT | Mod: ,,, | Performed by: ORTHOPAEDIC SURGERY

## 2024-02-13 PROCEDURE — 99213 OFFICE O/P EST LOW 20 MIN: CPT | Mod: PBBFAC | Performed by: ORTHOPAEDIC SURGERY

## 2024-02-13 NOTE — PATIENT INSTRUCTIONS
Your surgery is scheduled for 03/15/2025 at [facility] in [place].  Trigger finger release , right  ring        Labwork (1st floor clinic) _2/13  EKG      (2nd floor clinic)_2/13        Our office will contact you the day before surgery with your arrival time.  Do not eat or drink anything after midnight the night before surgery (this includes gum, candy, chewing tobacco, etc).  Bring all medication in their original bottles.  Bathe with Hibiclens the night or morning before your surgery.  The morning of your surgery ONLY take blood pressure, heart, acid reflux, or thyroid (if you take a morning dose) medication.  Take these medications with a sip of water.   Be sure to have stopped your blood thinner medication at the appropriate time, as instructed.  Bring your C-Pap machine if you have one.  All jewelry, piercings, or false eyelashes MUST be removed prior to surgery.

## 2024-02-13 NOTE — PROGRESS NOTES
HPI:   Davi Talamantes is a pleasant 58 y.o. patient who reports to clinic for evaluation of right hand pain.     Injury onset and description: Patient reports he has had locking in his right ring finger as well as his middle finger in his left hand.  He does have a brace he wears constantly to keep his finger from locking.   Patient's occupation:  for [employer]  This is not a work related injury.   This injury has been non-responsive to conservative care. The pain is worse with repetitive use, and strenuous activity is very difficult.    his pain improves with rest.  he rates pain as a  8/10on the Visual Analog Scale.        Patient also complains of right knee pain that has been going on for several months.    He saw Dr. DELGADO in the past and was told he had gout.   He vitaly off fluid at that visit. Patient states it was about 60 cc fluid.   He states he is working on weight loss at this time.     PAST MEDICAL HISTORY:   Past Medical History:   Diagnosis Date    Diabetes mellitus, type 2      PAST SURGICAL HISTORY:   Past Surgical History:   Procedure Laterality Date    TONSILLECTOMY  1972     MEDICATIONS:    Current Outpatient Medications:     allopurinoL (ZYLOPRIM) 100 MG tablet, Take 1 tablet (100 mg total) by mouth once daily., Disp: 90 tablet, Rfl: 1    allopurinoL (ZYLOPRIM) 300 MG tablet, Take 1 tablet (300 mg total) by mouth once daily., Disp: 90 tablet, Rfl: 3    atorvastatin (LIPITOR) 20 MG tablet, Take 1 tablet (20 mg total) by mouth once daily., Disp: 90 tablet, Rfl: 3    benzonatate (TESSALON) 100 MG capsule, , Disp: , Rfl:     olmesartan (BENICAR) 20 MG tablet, Take 1 tablet (20 mg total) by mouth once daily., Disp: 90 tablet, Rfl: 3    semaglutide (RYBELSUS) 14 mg tablet, Take 1 tablet (14 mg total) by mouth once daily., Disp: 90 tablet, Rfl: 3    traMADoL (ULTRAM) 50 mg tablet, Take 1 tablet (50 mg total) by mouth every 6 (six) hours as needed for Pain., Disp: 40 tablet, Rfl: 1  ALLERGIES:    Review of patient's allergies indicates:  No Known Allergies      PHYSICAL EXAM:  VITAL SIGNS: There were no vitals taken for this visit.  General: Well-developed well-nourished 58 y.o. malein no acute distress;Cardiovascular: Regular rhythm by palpation of distal pulse, normal color and temperature, no concerning varicosities on symptomatic side Lungs: No labored breathing or wheezing appreciated Neuro: Alert and oriented ×3 Psychiatric: well oriented to person, place and time, demonstrates normal mood and affect Skin: No rashes, lesions or ulcers, normal temperature, turgor, and texture on uninvolved extremity    General    Constitutional: He is oriented to person, place, and time. He appears well-developed and well-nourished.   HENT:   Head: Normocephalic and atraumatic.   Nose: Nose normal.   Eyes: EOM are normal. Pupils are equal, round, and reactive to light.   Cardiovascular:  Normal rate and intact distal pulses.            Pulmonary/Chest: Effort normal. No respiratory distress. He exhibits no tenderness.   Abdominal: Soft. He exhibits no distension. There is no abdominal tenderness.   Neurological: He is alert and oriented to person, place, and time. He has normal reflexes.   Psychiatric: He has a normal mood and affect. His behavior is normal. Judgment and thought content normal.           Right Knee Exam     Inspection   Swelling: present  Deformity: absent    Tenderness   The patient is tender to palpation of the medial retinaculum and medial joint line.    Crepitus   The patient has crepitus of the medial joint line and medial plica.    Range of Motion   Flexion:  abnormal     Tests   Meniscus   Ra:  Medial - positive   Ligament Examination   Lachman: normal (-1 to 2mm)   MCL - Valgus: normal (0 to 2mm)  LCL - Varus: normal  Dial Test at 30 degrees: normal (< 5 degrees)  Posterolateral Corner: unstable (>15 degrees difference)  Patella   Patellar apprehension: negative  Patellar Grind:  positive    Other   Sensation: normal    Comments:  Pain with Thessaly Maneuver    Left Knee Exam   Left knee exam is normal.    Muscle Strength   Right Lower Extremity   Quadriceps:  5/5   Hamstrin/5     Reflexes     Right Side   Achilles:  2+  Quadriceps:  2+    Vascular Exam     Right Pulses  Dorsalis Pedis:      2+  Posterior Tibial:      2+        Inspected the right hand today and there is active triggering of the ring finger of the right hand there is tenderness of the A1 pulley.  He has otherwise normal examination of the hand with no other deformity demonstrated normal neurovascular exam.    IMAGING:  No results found.      ASSESSMENT:      ICD-10-CM ICD-9-CM   1. Right knee pain, unspecified chronicity  M25.561 719.46   2. Trigger finger of right hand, unspecified finger  M65.30 727.03       PLAN:     -Findings and treatment options were discussed with the patient  -All questions answered      We are going to plan for right ring finger A1 pulley release    We have discussed risks of hand surgery which include but are not limited to nerve or blood vessel damsge  blood clots in the legs that can travel to the lungs (pulmonary embolism). Pulmonary embolism can cause shortness of breath, chest pain, and even shock. Other risks include urinary tract infection, nausea and vomiting (usually related to pain medication), chronic pain and stiffness, bleeding i nerve damage, blood vessel injury, and infection of the knee which can require re-operation. Furthermore, the risks of anesthesia include potential heart, lung, kidney, and liver damage.  he understands and is ready for the procedure.       Also discussed his right knee I went over some of these results with him today.  He he was thought to have a history of gout but no crystals were seen on his crystal analysis I am going to re-evaluate his knee at our next visit but will plan for A1 pulley release right ring finger at this time  There are no Patient  Instructions on file for this visit.  No orders of the defined types were placed in this encounter.    Procedures

## 2024-02-16 DIAGNOSIS — Z01.818 PREPROCEDURAL EXAMINATION: Primary | ICD-10-CM

## 2024-02-16 DIAGNOSIS — M65.341 TRIGGER FINGER, RIGHT RING FINGER: ICD-10-CM

## 2024-02-16 DIAGNOSIS — M65.30 TRIGGER FINGER OF RIGHT HAND, UNSPECIFIED FINGER: Primary | ICD-10-CM

## 2024-02-16 RX ORDER — MUPIROCIN 20 MG/G
OINTMENT TOPICAL
Status: CANCELLED | OUTPATIENT
Start: 2024-02-16

## 2024-02-16 RX ORDER — CEFAZOLIN SODIUM 2 G/50ML
2 SOLUTION INTRAVENOUS
Status: CANCELLED | OUTPATIENT
Start: 2024-02-16

## 2024-02-16 RX ORDER — SODIUM CHLORIDE 9 MG/ML
INJECTION, SOLUTION INTRAVENOUS CONTINUOUS
Status: CANCELLED | OUTPATIENT
Start: 2024-02-16

## 2024-03-04 ENCOUNTER — PATIENT MESSAGE (OUTPATIENT)
Dept: ORTHOPEDICS | Facility: CLINIC | Age: 59
End: 2024-03-04
Payer: COMMERCIAL

## 2024-03-05 ENCOUNTER — OFFICE VISIT (OUTPATIENT)
Dept: NEUROLOGY | Facility: CLINIC | Age: 59
End: 2024-03-05
Payer: COMMERCIAL

## 2024-03-05 VITALS
DIASTOLIC BLOOD PRESSURE: 90 MMHG | HEIGHT: 69 IN | SYSTOLIC BLOOD PRESSURE: 144 MMHG | BODY MASS INDEX: 36.73 KG/M2 | OXYGEN SATURATION: 98 % | HEART RATE: 94 BPM | WEIGHT: 248 LBS

## 2024-03-05 DIAGNOSIS — E53.8 VITAMIN B12 DEFICIENCY: ICD-10-CM

## 2024-03-05 DIAGNOSIS — E03.9 HYPOTHYROIDISM, UNSPECIFIED TYPE: ICD-10-CM

## 2024-03-05 DIAGNOSIS — M48.02 SPINAL STENOSIS, CERVICAL REGION: Primary | ICD-10-CM

## 2024-03-05 DIAGNOSIS — R20.0 RIGHT SIDED NUMBNESS: ICD-10-CM

## 2024-03-05 DIAGNOSIS — R29.818 OTHER SYMPTOMS AND SIGNS INVOLVING THE NERVOUS SYSTEM: ICD-10-CM

## 2024-03-05 PROCEDURE — 3044F HG A1C LEVEL LT 7.0%: CPT | Mod: ,,, | Performed by: NURSE PRACTITIONER

## 2024-03-05 PROCEDURE — 1160F RVW MEDS BY RX/DR IN RCRD: CPT | Mod: ,,, | Performed by: NURSE PRACTITIONER

## 2024-03-05 PROCEDURE — 99215 OFFICE O/P EST HI 40 MIN: CPT | Mod: PBBFAC | Performed by: NURSE PRACTITIONER

## 2024-03-05 PROCEDURE — 3008F BODY MASS INDEX DOCD: CPT | Mod: ,,, | Performed by: NURSE PRACTITIONER

## 2024-03-05 PROCEDURE — 3080F DIAST BP >= 90 MM HG: CPT | Mod: ,,, | Performed by: NURSE PRACTITIONER

## 2024-03-05 PROCEDURE — 1159F MED LIST DOCD IN RCRD: CPT | Mod: ,,, | Performed by: NURSE PRACTITIONER

## 2024-03-05 PROCEDURE — 99203 OFFICE O/P NEW LOW 30 MIN: CPT | Mod: S$PBB,,, | Performed by: NURSE PRACTITIONER

## 2024-03-05 PROCEDURE — 4010F ACE/ARB THERAPY RXD/TAKEN: CPT | Mod: ,,, | Performed by: NURSE PRACTITIONER

## 2024-03-05 PROCEDURE — 3077F SYST BP >= 140 MM HG: CPT | Mod: ,,, | Performed by: NURSE PRACTITIONER

## 2024-03-05 NOTE — PATIENT INSTRUCTIONS
MRI brain and cervical spine for right sided weakness and pain  Labs as ordered  Release for prior neurology evaluation including EMG/NCS from [facility] in [place]  Hold on medications pending workup  EMG/NCS of the 4 extremities with Dr. Cameron Carson
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
## 2024-03-06 DIAGNOSIS — E53.8 VITAMIN B12 DEFICIENCY: Primary | ICD-10-CM

## 2024-03-06 RX ORDER — CYANOCOBALAMIN 1000 UG/ML
1000 INJECTION, SOLUTION INTRAMUSCULAR; SUBCUTANEOUS
Qty: 3 ML | Refills: 1 | Status: SHIPPED | OUTPATIENT
Start: 2024-03-06

## 2024-03-07 ENCOUNTER — PATIENT MESSAGE (OUTPATIENT)
Dept: NEUROLOGY | Facility: CLINIC | Age: 59
End: 2024-03-07
Payer: COMMERCIAL

## 2024-03-07 ENCOUNTER — PATIENT MESSAGE (OUTPATIENT)
Dept: ORTHOPEDICS | Facility: CLINIC | Age: 59
End: 2024-03-07
Payer: COMMERCIAL

## 2024-03-11 ENCOUNTER — PATIENT MESSAGE (OUTPATIENT)
Dept: INTERNAL MEDICINE | Facility: CLINIC | Age: 59
End: 2024-03-11
Payer: COMMERCIAL

## 2024-03-11 RX ORDER — PREDNISONE 20 MG/1
20 TABLET ORAL DAILY
Qty: 10 TABLET | Refills: 0 | Status: SHIPPED | OUTPATIENT
Start: 2024-03-11 | End: 2024-03-21

## 2024-03-20 ENCOUNTER — PATIENT MESSAGE (OUTPATIENT)
Dept: INTERNAL MEDICINE | Facility: CLINIC | Age: 59
End: 2024-03-20
Payer: COMMERCIAL

## 2024-03-20 DIAGNOSIS — M79.673 HEEL PAIN, UNSPECIFIED LATERALITY: Primary | ICD-10-CM

## 2024-03-26 ENCOUNTER — PATIENT MESSAGE (OUTPATIENT)
Dept: ORTHOPEDICS | Facility: CLINIC | Age: 59
End: 2024-03-26
Payer: COMMERCIAL

## 2024-03-28 ENCOUNTER — TELEPHONE (OUTPATIENT)
Dept: NEUROLOGY | Facility: CLINIC | Age: 59
End: 2024-03-28
Payer: COMMERCIAL

## 2024-03-28 ENCOUNTER — PATIENT MESSAGE (OUTPATIENT)
Dept: NEUROLOGY | Facility: CLINIC | Age: 59
End: 2024-03-28
Payer: COMMERCIAL

## 2024-03-28 NOTE — TELEPHONE ENCOUNTER
"PT VOICED UNDERSTANDING.    ----- Message from SHIRA Mireles sent at 3/28/2024  2:06 PM CDT -----  Normal aging process changes in the brain, nothing to do to correct it, typically just good management of conditions like high blood pressure, avoiding smoking, drinking alcohol, etc.  ----- Message -----  From: Kassie Adkins MA  Sent: 3/28/2024   9:25 AM CDT  To: SHIRA Mireles    Pt said, he looked over his results on Mychart and wants to know what "global brain volume loss" means and if that's something to be concerned about.      "

## 2024-03-28 NOTE — TELEPHONE ENCOUNTER
PT VOICED UNDERSTANDING.    ----- Message from SHIRA Mireles sent at 3/26/2024  4:50 PM CDT -----  MRI brain no acute findings, no prior stroke noted.  MRI cervical spine showed multilevel degenerative changes, more at level of c3/4 and c/4 5 but no abormal cord signal appreciated.

## 2024-03-28 NOTE — TELEPHONE ENCOUNTER
PT VOICED UNDERSTANDING.    ----- Message from SHIRA Mireles sent at 3/26/2024  4:49 PM CDT -----  MRI brain no acute findings, no prior stroke noted.  MRI cervical spine showed multilevel degenerative changes, more at level of c3/4 and c/4 5 but no abormal cord signal appreciated.

## 2024-04-05 ENCOUNTER — CLINICAL SUPPORT (OUTPATIENT)
Dept: CARDIOLOGY | Facility: CLINIC | Age: 59
End: 2024-04-05
Payer: COMMERCIAL

## 2024-04-05 DIAGNOSIS — Z01.818 PREPROCEDURAL EXAMINATION: ICD-10-CM

## 2024-04-05 PROCEDURE — 93010 ELECTROCARDIOGRAM REPORT: CPT | Mod: S$PBB,,, | Performed by: HOSPITALIST

## 2024-04-05 PROCEDURE — 93005 ELECTROCARDIOGRAM TRACING: CPT | Mod: PBBFAC | Performed by: HOSPITALIST

## 2024-04-05 PROCEDURE — 99211 OFF/OP EST MAY X REQ PHY/QHP: CPT | Mod: PBBFAC

## 2024-04-09 ENCOUNTER — PATIENT MESSAGE (OUTPATIENT)
Dept: ORTHOPEDICS | Facility: CLINIC | Age: 59
End: 2024-04-09
Payer: COMMERCIAL

## 2024-04-10 ENCOUNTER — HOSPITAL ENCOUNTER (OUTPATIENT)
Facility: HOSPITAL | Age: 59
Discharge: HOME | End: 2024-04-10
Attending: ORTHOPAEDIC SURGERY
Payer: COMMERCIAL

## 2024-04-10 ENCOUNTER — ANESTHESIA (OUTPATIENT)
Dept: SURGERY | Facility: HOSPITAL | Age: 59
End: 2024-04-10
Payer: COMMERCIAL

## 2024-04-10 ENCOUNTER — ANESTHESIA EVENT (OUTPATIENT)
Dept: SURGERY | Facility: HOSPITAL | Age: 59
End: 2024-04-10
Payer: COMMERCIAL

## 2024-04-10 VITALS
TEMPERATURE: 98 F | SYSTOLIC BLOOD PRESSURE: 159 MMHG | OXYGEN SATURATION: 93 % | RESPIRATION RATE: 20 BRPM | DIASTOLIC BLOOD PRESSURE: 86 MMHG | BODY MASS INDEX: 34.59 KG/M2 | HEART RATE: 78 BPM | HEIGHT: 71 IN

## 2024-04-10 DIAGNOSIS — M65.341 TRIGGER FINGER, RIGHT RING FINGER: Primary | ICD-10-CM

## 2024-04-10 DIAGNOSIS — M65.30 TRIGGER FINGER OF RIGHT HAND, UNSPECIFIED FINGER: ICD-10-CM

## 2024-04-10 LAB — GLUCOSE SERPL-MCNC: 88 MG/DL (ref 70–105)

## 2024-04-10 PROCEDURE — 82962 GLUCOSE BLOOD TEST: CPT

## 2024-04-10 PROCEDURE — 37000009 HC ANESTHESIA EA ADD 15 MINS: Performed by: ORTHOPAEDIC SURGERY

## 2024-04-10 PROCEDURE — 63600175 PHARM REV CODE 636 W HCPCS: Performed by: NURSE ANESTHETIST, CERTIFIED REGISTERED

## 2024-04-10 PROCEDURE — 71000033 HC RECOVERY, INTIAL HOUR: Performed by: ORTHOPAEDIC SURGERY

## 2024-04-10 PROCEDURE — 25000003 PHARM REV CODE 250: Performed by: ORTHOPAEDIC SURGERY

## 2024-04-10 PROCEDURE — D9220A PRA ANESTHESIA: Mod: ANES,,, | Performed by: ANESTHESIOLOGY

## 2024-04-10 PROCEDURE — 36000707: Performed by: ORTHOPAEDIC SURGERY

## 2024-04-10 PROCEDURE — 26055 INCISE FINGER TENDON SHEATH: CPT | Mod: F8,,, | Performed by: ORTHOPAEDIC SURGERY

## 2024-04-10 PROCEDURE — 63600175 PHARM REV CODE 636 W HCPCS: Performed by: ORTHOPAEDIC SURGERY

## 2024-04-10 PROCEDURE — 36000706: Performed by: ORTHOPAEDIC SURGERY

## 2024-04-10 PROCEDURE — 25000003 PHARM REV CODE 250: Performed by: NURSE ANESTHETIST, CERTIFIED REGISTERED

## 2024-04-10 PROCEDURE — 71000015 HC POSTOP RECOV 1ST HR: Performed by: ORTHOPAEDIC SURGERY

## 2024-04-10 PROCEDURE — D9220A PRA ANESTHESIA: Mod: CRNA,,, | Performed by: NURSE ANESTHETIST, CERTIFIED REGISTERED

## 2024-04-10 PROCEDURE — 37000008 HC ANESTHESIA 1ST 15 MINUTES: Performed by: ORTHOPAEDIC SURGERY

## 2024-04-10 PROCEDURE — 27000284 HC CANNULA NASAL: Performed by: NURSE ANESTHETIST, CERTIFIED REGISTERED

## 2024-04-10 PROCEDURE — 63600175 PHARM REV CODE 636 W HCPCS: Mod: JZ,JG | Performed by: ORTHOPAEDIC SURGERY

## 2024-04-10 PROCEDURE — 27000716 HC OXISENSOR PROBE, ANY SIZE: Performed by: NURSE ANESTHETIST, CERTIFIED REGISTERED

## 2024-04-10 RX ORDER — PROPOFOL 10 MG/ML
INJECTION, EMULSION INTRAVENOUS
Status: DISCONTINUED | OUTPATIENT
Start: 2024-04-10 | End: 2024-04-10

## 2024-04-10 RX ORDER — SODIUM CHLORIDE 0.9 % (FLUSH) 0.9 %
2 SYRINGE (ML) INJECTION EVERY 8 HOURS PRN
Status: DISCONTINUED | OUTPATIENT
Start: 2024-04-10 | End: 2024-04-10 | Stop reason: HOSPADM

## 2024-04-10 RX ORDER — HYDROMORPHONE HYDROCHLORIDE 2 MG/ML
0.5 INJECTION, SOLUTION INTRAMUSCULAR; INTRAVENOUS; SUBCUTANEOUS EVERY 5 MIN PRN
Status: DISCONTINUED | OUTPATIENT
Start: 2024-04-10 | End: 2024-04-10 | Stop reason: HOSPADM

## 2024-04-10 RX ORDER — ONDANSETRON HYDROCHLORIDE 2 MG/ML
4 INJECTION, SOLUTION INTRAVENOUS DAILY PRN
Status: DISCONTINUED | OUTPATIENT
Start: 2024-04-10 | End: 2024-04-10 | Stop reason: HOSPADM

## 2024-04-10 RX ORDER — IPRATROPIUM BROMIDE AND ALBUTEROL SULFATE 2.5; .5 MG/3ML; MG/3ML
3 SOLUTION RESPIRATORY (INHALATION) ONCE AS NEEDED
Status: DISCONTINUED | OUTPATIENT
Start: 2024-04-10 | End: 2024-04-10 | Stop reason: HOSPADM

## 2024-04-10 RX ORDER — MIDAZOLAM HYDROCHLORIDE 1 MG/ML
INJECTION INTRAMUSCULAR; INTRAVENOUS
Status: DISCONTINUED | OUTPATIENT
Start: 2024-04-10 | End: 2024-04-10

## 2024-04-10 RX ORDER — MEPERIDINE HYDROCHLORIDE 25 MG/ML
25 INJECTION INTRAMUSCULAR; INTRAVENOUS; SUBCUTANEOUS ONCE AS NEEDED
Status: DISCONTINUED | OUTPATIENT
Start: 2024-04-10 | End: 2024-04-10 | Stop reason: HOSPADM

## 2024-04-10 RX ORDER — MUPIROCIN 20 MG/G
OINTMENT TOPICAL 2 TIMES DAILY
Status: CANCELLED | OUTPATIENT
Start: 2024-04-10 | End: 2024-04-13

## 2024-04-10 RX ORDER — DIPHENHYDRAMINE HYDROCHLORIDE 50 MG/ML
25 INJECTION INTRAMUSCULAR; INTRAVENOUS EVERY 6 HOURS PRN
Status: DISCONTINUED | OUTPATIENT
Start: 2024-04-10 | End: 2024-04-10 | Stop reason: HOSPADM

## 2024-04-10 RX ORDER — MORPHINE SULFATE 10 MG/ML
4 INJECTION INTRAMUSCULAR; INTRAVENOUS; SUBCUTANEOUS EVERY 5 MIN PRN
Status: DISCONTINUED | OUTPATIENT
Start: 2024-04-10 | End: 2024-04-10 | Stop reason: HOSPADM

## 2024-04-10 RX ORDER — HYDROMORPHONE HYDROCHLORIDE 2 MG/ML
1 INJECTION, SOLUTION INTRAMUSCULAR; INTRAVENOUS; SUBCUTANEOUS EVERY 4 HOURS PRN
Status: CANCELLED | OUTPATIENT
Start: 2024-04-10

## 2024-04-10 RX ORDER — BUPIVACAINE HYDROCHLORIDE 2.5 MG/ML
INJECTION, SOLUTION EPIDURAL; INFILTRATION; INTRACAUDAL
Status: DISCONTINUED | OUTPATIENT
Start: 2024-04-10 | End: 2024-04-10 | Stop reason: HOSPADM

## 2024-04-10 RX ORDER — OXYCODONE AND ACETAMINOPHEN 5; 325 MG/1; MG/1
1 TABLET ORAL EVERY 6 HOURS PRN
Qty: 6 TABLET | Refills: 0 | Status: SHIPPED | OUTPATIENT
Start: 2024-04-10

## 2024-04-10 RX ORDER — SODIUM CHLORIDE 9 MG/ML
INJECTION, SOLUTION INTRAVENOUS CONTINUOUS
Status: DISCONTINUED | OUTPATIENT
Start: 2024-04-10 | End: 2024-04-10 | Stop reason: HOSPADM

## 2024-04-10 RX ORDER — LIDOCAINE HYDROCHLORIDE 20 MG/ML
INJECTION INTRAVENOUS
Status: DISCONTINUED | OUTPATIENT
Start: 2024-04-10 | End: 2024-04-10

## 2024-04-10 RX ORDER — MUPIROCIN 20 MG/G
OINTMENT TOPICAL
Status: DISCONTINUED | OUTPATIENT
Start: 2024-04-10 | End: 2024-04-10 | Stop reason: HOSPADM

## 2024-04-10 RX ORDER — OXYCODONE HYDROCHLORIDE 5 MG/1
5 TABLET ORAL EVERY 4 HOURS PRN
Status: CANCELLED | OUTPATIENT
Start: 2024-04-10

## 2024-04-10 RX ORDER — ONDANSETRON 4 MG/1
8 TABLET, ORALLY DISINTEGRATING ORAL EVERY 8 HOURS PRN
Status: CANCELLED | OUTPATIENT
Start: 2024-04-10

## 2024-04-10 RX ORDER — FENTANYL CITRATE 50 UG/ML
INJECTION, SOLUTION INTRAMUSCULAR; INTRAVENOUS
Status: DISCONTINUED | OUTPATIENT
Start: 2024-04-10 | End: 2024-04-10

## 2024-04-10 RX ORDER — ONDANSETRON 4 MG/1
4 TABLET, ORALLY DISINTEGRATING ORAL EVERY 8 HOURS PRN
Qty: 10 TABLET | Refills: 0 | Status: SHIPPED | OUTPATIENT
Start: 2024-04-10

## 2024-04-10 RX ADMIN — MIDAZOLAM HYDROCHLORIDE 2 MG: 1 INJECTION, SOLUTION INTRAMUSCULAR; INTRAVENOUS at 03:04

## 2024-04-10 RX ADMIN — FENTANYL CITRATE 100 MCG: 50 INJECTION INTRAMUSCULAR; INTRAVENOUS at 03:04

## 2024-04-10 RX ADMIN — CEFAZOLIN 2 G: 2 INJECTION, POWDER, FOR SOLUTION INTRAMUSCULAR; INTRAVENOUS at 03:04

## 2024-04-10 RX ADMIN — LIDOCAINE HYDROCHLORIDE 200 MG: 20 INJECTION, SOLUTION INTRAVENOUS at 03:04

## 2024-04-10 RX ADMIN — SODIUM CHLORIDE: 9 INJECTION, SOLUTION INTRAVENOUS at 02:04

## 2024-04-10 RX ADMIN — PROPOFOL 50 MG: 10 INJECTION, EMULSION INTRAVENOUS at 03:04

## 2024-04-10 NOTE — ANESTHESIA PREPROCEDURE EVALUATION
04/10/2024  Davi Talamantes is a 58 y.o., male.      Pre-op Assessment    I have reviewed the Patient Summary Reports.     I have reviewed the Nursing Notes. I have reviewed the NPO Status.   I have reviewed the Medications.     Review of Systems  Anesthesia Hx:  No problems with previous Anesthesia             Denies Family Hx of Anesthesia complications.    Denies Personal Hx of Anesthesia complications.                    Social:  No Alcohol Use, Non-Smoker       Cardiovascular:  Exercise tolerance: good   Hypertension           hyperlipidemia                             Musculoskeletal:  Arthritis               Neurological:        Peripheral Neuropathy                          Endocrine:  Diabetes         Obesity / BMI > 30      Physical Exam  General: Well nourished, Cooperative and Alert    Airway:  Mallampati: II   Mouth Opening: Normal  TM Distance: Normal  Tongue: Normal  Neck ROM: Normal ROM    Dental:  Intact    Chest/Lungs:  Clear to auscultation, Normal Respiratory Rate    Heart:  Rate: Normal  Rhythm: Regular Rhythm        Chemistry        Component Value Date/Time     04/05/2024 0812    K 4.6 04/05/2024 0812     04/05/2024 0812    CO2 28 04/05/2024 0812    BUN 12 04/05/2024 0812    CREATININE 0.97 04/05/2024 0812     04/05/2024 0812        Component Value Date/Time    CALCIUM 9.5 04/05/2024 0812    ALKPHOS 70 03/05/2024 1134    AST 20 03/05/2024 1134    ALT 37 03/05/2024 1134    BILITOT 0.3 03/05/2024 1134        Lab Results   Component Value Date    WBC 7.46 03/05/2024    HGB 14.9 03/05/2024    HCT 44.3 03/05/2024     03/05/2024     No results found for this or any previous visit.      Anesthesia Plan  Type of Anesthesia, risks & benefits discussed:    Anesthesia Type: MAC, Gen Natural Airway, Regional  Intra-op Monitoring Plan: Standard ASA Monitors  Post Op Pain Control  Plan: multimodal analgesia  Induction:  IV  Informed Consent: Informed consent signed with the Patient and all parties understand the risks and agree with anesthesia plan.  All questions answered.   ASA Score: 3  Day of Surgery Review of History & Physical: H&P Update referred to the surgeon/provider.I have interviewed and examined the patient. I have reviewed the patient's H&P dated: There are no significant changes.   Anesthesia Plan Notes: ASA 3, MAC+IV regional    Ready For Surgery From Anesthesia Perspective.     .

## 2024-04-10 NOTE — H&P
H&P completed  has been reviewed, the patient has been examined and:  I concur with the findings and no changes have occurred since H&P was written.    There are no hospital problems to display for this patient.             HPI:   Davi Talamantes is a pleasant 58 y.o. patient who reports to clinic for evaluation of right hand pain.     Injury onset and description: Patient reports he has had locking in his right ring finger as well as his middle finger in his left hand.  He does have a brace he wears constantly to keep his finger from locking.   Patient's occupation:  for [employer]  This is not a work related injury.   This injury has been non-responsive to conservative care. The pain is worse with repetitive use, and strenuous activity is very difficult.    his pain improves with rest.  he rates pain as a  8/10on the Visual Analog Scale.         Patient also complains of right knee pain that has been going on for several months.    He saw Dr. DELGADO in the past and was told he had gout.   He vitaly off fluid at that visit. Patient states it was about 60 cc fluid.   He states he is working on weight loss at this time.      PAST MEDICAL HISTORY:        Past Medical History:   Diagnosis Date    Diabetes mellitus, type 2        PAST SURGICAL HISTORY:         Past Surgical History:   Procedure Laterality Date    TONSILLECTOMY   1972      MEDICATIONS:     Current Outpatient Medications:     allopurinoL (ZYLOPRIM) 100 MG tablet, Take 1 tablet (100 mg total) by mouth once daily., Disp: 90 tablet, Rfl: 1    allopurinoL (ZYLOPRIM) 300 MG tablet, Take 1 tablet (300 mg total) by mouth once daily., Disp: 90 tablet, Rfl: 3    atorvastatin (LIPITOR) 20 MG tablet, Take 1 tablet (20 mg total) by mouth once daily., Disp: 90 tablet, Rfl: 3    benzonatate (TESSALON) 100 MG capsule, , Disp: , Rfl:     olmesartan (BENICAR) 20 MG tablet, Take 1 tablet (20 mg total) by mouth once daily., Disp: 90 tablet, Rfl: 3    semaglutide  (RYBELSUS) 14 mg tablet, Take 1 tablet (14 mg total) by mouth once daily., Disp: 90 tablet, Rfl: 3    traMADoL (ULTRAM) 50 mg tablet, Take 1 tablet (50 mg total) by mouth every 6 (six) hours as needed for Pain., Disp: 40 tablet, Rfl: 1  ALLERGIES:   Review of patient's allergies indicates:  No Known Allergies        PHYSICAL EXAM:  VITAL SIGNS: There were no vitals taken for this visit.  General: Well-developed well-nourished 58 y.o. malein no acute distress;Cardiovascular: Regular rhythm by palpation of distal pulse, normal color and temperature, no concerning varicosities on symptomatic side Lungs: No labored breathing or wheezing appreciated Neuro: Alert and oriented ×3 Psychiatric: well oriented to person, place and time, demonstrates normal mood and affect Skin: No rashes, lesions or ulcers, normal temperature, turgor, and texture on uninvolved extremity     General     Constitutional: He is oriented to person, place, and time. He appears well-developed and well-nourished.   HENT:   Head: Normocephalic and atraumatic.   Nose: Nose normal.   Eyes: EOM are normal. Pupils are equal, round, and reactive to light.   Cardiovascular:  Normal rate and intact distal pulses.            Pulmonary/Chest: Effort normal. No respiratory distress. He exhibits no tenderness.   Abdominal: Soft. He exhibits no distension. There is no abdominal tenderness.   Neurological: He is alert and oriented to person, place, and time. He has normal reflexes.   Psychiatric: He has a normal mood and affect. His behavior is normal. Judgment and thought content normal.               Right Knee Exam      Inspection   Swelling: present  Deformity: absent     Tenderness   The patient is tender to palpation of the medial retinaculum and medial joint line.     Crepitus   The patient has crepitus of the medial joint line and medial plica.     Range of Motion   Flexion:  abnormal      Tests   Meniscus   Ra:  Medial - positive   Ligament  Examination   Lachman: normal (-1 to 2mm)   MCL - Valgus: normal (0 to 2mm)  LCL - Varus: normal  Dial Test at 30 degrees: normal (< 5 degrees)  Posterolateral Corner: unstable (>15 degrees difference)  Patella   Patellar apprehension: negative  Patellar Grind: positive     Other   Sensation: normal     Comments:  Pain with Thessaly Maneuver     Left Knee Exam   Left knee exam is normal.     Muscle Strength   Right Lower Extremity   Quadriceps:  5/5   Hamstrin/5      Reflexes      Right Side   Achilles:  2+  Quadriceps:  2+     Vascular Exam      Right Pulses  Dorsalis Pedis:      2+  Posterior Tibial:      2+           Inspected the right hand today and there is active triggering of the ring finger of the right hand there is tenderness of the A1 pulley.  He has otherwise normal examination of the hand with no other deformity demonstrated normal neurovascular exam.     IMAGING:  No results found.        ASSESSMENT:        ICD-10-CM ICD-9-CM   1. Right knee pain, unspecified chronicity  M25.561 719.46   2. Trigger finger of right hand, unspecified finger  M65.30 727.03         PLAN:     -Findings and treatment options were discussed with the patient  -All questions answered        We are going to plan for right ring finger A1 pulley release     We have discussed risks of hand surgery which include but are not limited to nerve or blood vessel damsge  blood clots in the legs that can travel to the lungs (pulmonary embolism). Pulmonary embolism can cause shortness of breath, chest pain, and even shock. Other risks include urinary tract infection, nausea and vomiting (usually related to pain medication), chronic pain and stiffness, bleeding i nerve damage, blood vessel injury, and infection of the knee which can require re-operation. Furthermore, the risks of anesthesia include potential heart, lung, kidney, and liver damage.  he understands and is ready for the procedure.         Also discussed his right knee I  went over some of these results with him today.  He he was thought to have a history of gout but no crystals were seen on his crystal analysis I am going to re-evaluate his knee at our next visit but will plan for A1 pulley release right ring finger at this time  There are no Patient Instructions on file for this visit.  No orders of the defined types were placed in this encounter.

## 2024-04-10 NOTE — ANESTHESIA POSTPROCEDURE EVALUATION
Anesthesia Post Evaluation    Patient: Davi Talamantes    Procedure(s) Performed: Procedure(s) (LRB):  RELEASE, TRIGGER FINGER (Right)    Final Anesthesia Type: MAC      Patient location during evaluation: PACU  Patient participation: Yes- Able to Participate  Level of consciousness: awake and alert and oriented  Post-procedure vital signs: reviewed and stable  Pain management: adequate  Airway patency: patent  LYNNE mitigation strategies: Multimodal analgesia and Use of major conduction anesthesia (spinal/epidural) or peripheral nerve block  PONV status at discharge: No PONV  Anesthetic complications: no      Cardiovascular status: hemodynamically stable  Respiratory status: unassisted and spontaneous ventilation  Hydration status: euvolemic  Follow-up needed               Vitals Value Taken Time   /81 04/10/24 1631   Temp 36.4 °C (97.6 °F) 04/10/24 1542   Pulse 73 04/10/24 1645   Resp 17 04/10/24 1610   SpO2 97 % 04/10/24 1645   Vitals shown include unvalidated device data.      Event Time   Out of Recovery 14:11:00         Pain/Chevy Score: Chevy Score: 10 (4/10/2024  4:15 PM)

## 2024-04-10 NOTE — OP NOTE
[facility] - Orthopedic Periop Services  Operative Note    Surgery Date: 4/10/2024      Surgeon(s) and Role:     * Nick Asif MD - Primary    Assistant: Juan José Keenan    Pre-op Diagnosis:   Trigger finger of right hand, ring finger    Post-op Diagnosis:   same    Procedure:  Procedure(s) (LRB):  RELEASE, TRIGGER FINGER (Right) (ring finger)    Anesthesia:  Prince Block    EBL:  1 cc    Implants: * No implants in log *    Tourniquet time: 16 mins    Complication:   none    Procedure: The patient was taken to the operating room and placedsupine.  Anesthesia was administered and pre-operative antibiotics were given.  A timeout was performed.  Patient was positioned appropriately and prepped and draped in the usual sterile fashion.        A 1.5 cm longitudinal incision was made overlying the A1 pulley of the digit.  Subcutaneous tissue was bluntly dissected to avoid injury to the digital nerves.  The A1 pulley was easily identified.  It was incised using a #15 blade scalpel along with tenotomy scissors.  Following this, he had full excursion of the tendon with no further triggering.    Any bleeding was stopped using pressure and the wound was irrigated.  The skin was closed with interrupted horizontal mattress sutures of #4-0 nylon. Sterile dressing was applied and he was returned to the Postanesthesia Care Unit in stable condition.        As the attending surgeon I was physically present for the key/critical portions of the procedure.        Disposition:awakened from anesthesia, extubated and taken to the recovery room in a stable condition, having suffered no apparent untoward event.

## 2024-04-10 NOTE — TRANSFER OF CARE
"Anesthesia Transfer of Care Note    Patient: Davi Talamantes    Procedure(s) Performed: Procedure(s) (LRB):  RELEASE, TRIGGER FINGER (Right)    Patient location: PACU    Anesthesia Type: MAC and regional    Transport from OR: Transported from OR on room air with adequate spontaneous ventilation. Continuous ECG monitoring in transport. Continuous SpO2 monitoring in transport    Post pain: adequate analgesia    Post assessment: no apparent anesthetic complications    Post vital signs: stable    Level of consciousness: sedated and responds to stimulation    Nausea/Vomiting: no nausea/vomiting    Complications: none    Transfer of care protocol was followedComments: Good SV continue, NAD, VSS, RTRN      Last vitals: Visit Vitals  BP (!) 142/69 (BP Location: Left arm, Patient Position: Lying)   Pulse 86   Temp 36.4 °C (97.6 °F) (Oral)   Resp 15   Ht 5' 11" (1.803 m)   SpO2 95%   BMI 34.59 kg/m²     "

## 2024-04-11 ENCOUNTER — PATIENT MESSAGE (OUTPATIENT)
Dept: ORTHOPEDICS | Facility: CLINIC | Age: 59
End: 2024-04-11
Payer: COMMERCIAL

## 2024-04-16 ENCOUNTER — OFFICE VISIT (OUTPATIENT)
Dept: NEUROLOGY | Facility: CLINIC | Age: 59
End: 2024-04-16
Payer: COMMERCIAL

## 2024-04-16 VITALS
HEART RATE: 100 BPM | WEIGHT: 247 LBS | OXYGEN SATURATION: 95 % | DIASTOLIC BLOOD PRESSURE: 84 MMHG | BODY MASS INDEX: 36.58 KG/M2 | SYSTOLIC BLOOD PRESSURE: 150 MMHG | HEIGHT: 69 IN

## 2024-04-16 DIAGNOSIS — M48.02 SPINAL STENOSIS, CERVICAL REGION: ICD-10-CM

## 2024-04-16 DIAGNOSIS — R20.0 RIGHT SIDED NUMBNESS: Primary | ICD-10-CM

## 2024-04-16 PROCEDURE — 99214 OFFICE O/P EST MOD 30 MIN: CPT | Mod: PBBFAC | Performed by: NURSE PRACTITIONER

## 2024-04-16 PROCEDURE — 1159F MED LIST DOCD IN RCRD: CPT | Mod: ,,, | Performed by: NURSE PRACTITIONER

## 2024-04-16 PROCEDURE — 3079F DIAST BP 80-89 MM HG: CPT | Mod: ,,, | Performed by: NURSE PRACTITIONER

## 2024-04-16 PROCEDURE — 99213 OFFICE O/P EST LOW 20 MIN: CPT | Mod: S$PBB,,, | Performed by: NURSE PRACTITIONER

## 2024-04-16 PROCEDURE — 4010F ACE/ARB THERAPY RXD/TAKEN: CPT | Mod: ,,, | Performed by: NURSE PRACTITIONER

## 2024-04-16 PROCEDURE — 1160F RVW MEDS BY RX/DR IN RCRD: CPT | Mod: ,,, | Performed by: NURSE PRACTITIONER

## 2024-04-16 PROCEDURE — 3008F BODY MASS INDEX DOCD: CPT | Mod: ,,, | Performed by: NURSE PRACTITIONER

## 2024-04-16 PROCEDURE — 3077F SYST BP >= 140 MM HG: CPT | Mod: ,,, | Performed by: NURSE PRACTITIONER

## 2024-04-16 PROCEDURE — 3044F HG A1C LEVEL LT 7.0%: CPT | Mod: ,,, | Performed by: NURSE PRACTITIONER
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
## 2024-04-16 NOTE — PATIENT INSTRUCTIONS
Reviewed recent MRI brain and cervical spine  Recommend obtain and take B12 injections as directed  Will refer to Dr. Cameron Carson for EMG/NCS of the 4 extremities

## 2024-04-16 NOTE — DISCHARGE SUMMARY
[facility] - Orthopedic Periop Services  Discharge Note  Short Stay    Procedure(s) (LRB):  RELEASE, TRIGGER FINGER (Right)      OUTCOME: Patient tolerated treatment/procedure well without complication and is now ready for discharge.    DISPOSITION: Home or Self Care    FINAL DIAGNOSIS:  Trigger finger, right ring finger    FOLLOWUP: In clinic    DISCHARGE INSTRUCTIONS:    Discharge Procedure Orders   Diet general     Keep surgical extremity elevated     Leave dressing on - Keep it clean, dry, and intact until clinic visit     Call MD for:  temperature >100.4     Call MD for:  persistent nausea and vomiting     Call MD for:  severe uncontrolled pain     Call MD for:  difficulty breathing, headache or visual disturbances     Call MD for:  redness, tenderness, or signs of infection (pain, swelling, redness, odor or green/yellow discharge around incision site)     Call MD for:  hives     Call MD for:  persistent dizziness or light-headedness     Call MD for:  extreme fatigue         Clinical Reference Documents Added to Patient Instructions         Document    TRIGGER FINGER RELEASE DISCHARGE INSTRUCTIONS (ENGLISH)            TIME SPENT ON DISCHARGE: 9 minutes

## 2024-04-24 ENCOUNTER — OFFICE VISIT (OUTPATIENT)
Dept: ORTHOPEDICS | Facility: CLINIC | Age: 59
End: 2024-04-24
Payer: COMMERCIAL

## 2024-04-24 DIAGNOSIS — M65.30 TRIGGER FINGER OF RIGHT HAND, UNSPECIFIED FINGER: Primary | ICD-10-CM

## 2024-04-24 PROCEDURE — 4010F ACE/ARB THERAPY RXD/TAKEN: CPT | Mod: ,,, | Performed by: NURSE PRACTITIONER

## 2024-04-24 PROCEDURE — 99024 POSTOP FOLLOW-UP VISIT: CPT | Mod: ,,, | Performed by: NURSE PRACTITIONER

## 2024-04-24 PROCEDURE — 3044F HG A1C LEVEL LT 7.0%: CPT | Mod: ,,, | Performed by: NURSE PRACTITIONER

## 2024-04-24 PROCEDURE — 99212 OFFICE O/P EST SF 10 MIN: CPT | Mod: PBBFAC | Performed by: NURSE PRACTITIONER

## 2024-04-24 NOTE — PROGRESS NOTES
HISTORY OF PRESENT ILLNESS:       Release, Trigger Finger - Right 4/10/2024       Pt is here today for First post-operative followup of his No surgery found.  he is doing well.  We have reviewed his findings and discussed plan of care and future treatment options, including the physical therapy plan.     2 weeks postop trigger finger release right hand, doing well.  Incision looks good today.  Sutures removed and Steri-Strips applied.  He does have slight swelling to the finger.  We discussed wound care today.  Also discuss motion of the fingers.                                                                               PHYSICAL EXAMINATION:     Incision sites were inspected today.  There is no evidence of any erythema, infection or induration  Minimal effusion is present.  Patient is neurovascularly intact.   2+ radial and ulnar pulse pulses.        Imaging:                                                     ASSESSMENT:                                                                                                                                               1. Status post above, doing well.                                                                                                                               PLAN:       Wounds were treated today and a discussion of weight-bearing status was had with the patient.    Therapy plan discussed in great detail today; all questions answered.   Home exercises were prescribed                                                                       Discuss wound care today.  No lifting over 3 him lb with the right hand. .  Keep incision clean and dry.  Return to clinic 4 weeks.                                                                       There are no Patient Instructions on file for this visit.

## 2024-05-06 ENCOUNTER — PATIENT MESSAGE (OUTPATIENT)
Dept: INTERNAL MEDICINE | Facility: CLINIC | Age: 59
End: 2024-05-06
Payer: COMMERCIAL

## 2024-05-06 DIAGNOSIS — M10.9 GOUT, UNSPECIFIED CAUSE, UNSPECIFIED CHRONICITY, UNSPECIFIED SITE: Primary | ICD-10-CM

## 2024-05-07 RX ORDER — PREDNISONE 20 MG/1
20 TABLET ORAL DAILY
Qty: 10 TABLET | Refills: 0 | Status: SHIPPED | OUTPATIENT
Start: 2024-05-07 | End: 2024-06-11

## 2024-05-13 ENCOUNTER — TELEPHONE (OUTPATIENT)
Dept: ORTHOPEDICS | Facility: CLINIC | Age: 59
End: 2024-05-13
Payer: COMMERCIAL

## 2024-05-21 ENCOUNTER — OFFICE VISIT (OUTPATIENT)
Dept: ORTHOPEDICS | Facility: CLINIC | Age: 59
End: 2024-05-21
Payer: COMMERCIAL

## 2024-05-21 DIAGNOSIS — M65.30 TRIGGER FINGER OF RIGHT HAND, UNSPECIFIED FINGER: Primary | ICD-10-CM

## 2024-05-21 LAB
OHS QRS DURATION: 86 MS
OHS QTC CALCULATION: 448 MS

## 2024-05-21 PROCEDURE — 3044F HG A1C LEVEL LT 7.0%: CPT | Mod: ,,, | Performed by: NURSE PRACTITIONER

## 2024-05-21 PROCEDURE — 99212 OFFICE O/P EST SF 10 MIN: CPT | Mod: PBBFAC | Performed by: NURSE PRACTITIONER

## 2024-05-21 PROCEDURE — 99024 POSTOP FOLLOW-UP VISIT: CPT | Mod: ,,, | Performed by: NURSE PRACTITIONER

## 2024-05-21 PROCEDURE — 4010F ACE/ARB THERAPY RXD/TAKEN: CPT | Mod: ,,, | Performed by: NURSE PRACTITIONER

## 2024-05-21 RX ORDER — NAPROXEN 500 MG/1
500 TABLET ORAL 2 TIMES DAILY WITH MEALS
Qty: 60 TABLET | Refills: 0 | Status: SHIPPED | OUTPATIENT
Start: 2024-05-21 | End: 2024-06-17

## 2024-05-21 NOTE — PROGRESS NOTES
HISTORY OF PRESENT ILLNESS:       Release, Trigger Finger - Right 4/10/2024       Pt is here today for Second post-operative followup of his No surgery found.  he is doing well.  We have reviewed his findings and discussed plan of care and future treatment options, including the physical therapy plan.       Patient is 6 weeks status post right trigger finger release, right ring finger.  His incision is well healed however he does still have some swelling around his incision.  Swelling  extends down into his finger.  He reports he has not been able to do a lot with his hand due to the swelling and tenderness.  No signs of infection, no redness.                                                                               PHYSICAL EXAMINATION:     Incision sites were inspected today.  There is no evidence of any erythema, infection or induration  Minimal effusion is present.  Patient is neurovascularly intact.   2+ radial and ulnar pulse pulses.        Imaging:         No results found.                                                                                 ASSESSMENT:                                                                                                                                               1. Status post above, doing well.                                                                                                                               PLAN:       Wounds were treated today and a discussion of weight-bearing status was had with the patient.    Therapy plan discussed in great detail today; all questions answered.   Home exercises were prescribed             We will put him on an anti-inflammatory, naproxen p.o. b.I.d. and have him massage incision.  Return to clinic with Dr. Asif in 3 weeks for recheck.                                                                                                                                   There are no Patient Instructions on file for this  visit.

## 2024-06-11 ENCOUNTER — OFFICE VISIT (OUTPATIENT)
Dept: ORTHOPEDICS | Facility: CLINIC | Age: 59
End: 2024-06-11
Payer: COMMERCIAL

## 2024-06-11 DIAGNOSIS — Z98.890 S/P TRIGGER FINGER RELEASE: Primary | ICD-10-CM

## 2024-06-11 PROCEDURE — 99024 POSTOP FOLLOW-UP VISIT: CPT | Mod: ,,, | Performed by: ORTHOPAEDIC SURGERY

## 2024-06-11 PROCEDURE — 3044F HG A1C LEVEL LT 7.0%: CPT | Mod: ,,, | Performed by: ORTHOPAEDIC SURGERY

## 2024-06-11 PROCEDURE — 1159F MED LIST DOCD IN RCRD: CPT | Mod: ,,, | Performed by: ORTHOPAEDIC SURGERY

## 2024-06-11 PROCEDURE — 99213 OFFICE O/P EST LOW 20 MIN: CPT | Mod: PBBFAC | Performed by: ORTHOPAEDIC SURGERY

## 2024-06-11 PROCEDURE — 4010F ACE/ARB THERAPY RXD/TAKEN: CPT | Mod: ,,, | Performed by: ORTHOPAEDIC SURGERY

## 2024-06-11 RX ORDER — METHYLPREDNISOLONE 4 MG/1
TABLET ORAL
Qty: 21 EACH | Refills: 0 | Status: SHIPPED | OUTPATIENT
Start: 2024-06-11 | End: 2024-07-02

## 2024-06-11 NOTE — PROGRESS NOTES
HISTORY OF PRESENT ILLNESS:       No surgery found No surgery found      Pt is here today for Second post-operative followup of his No surgery found.  he is doing well and has no complaints.      We have reviewed his findings and discussed plan of care and future treatment options, including the physical therapy plan.                                                                                    PHYSICAL EXAMINATION:     Incision sites were inspected today.  There is no evidence of any erythema, infection or induration. A large amount of swelling is present around the A1 pulley  Minimal effusion is present.  Patient is neurovascularly intact.   2+ radial and ulnar pulse pulses.        Imaging:         No results found.                                                                                 ASSESSMENT:                                                                                                                                               1. Status post above, doing well.                                                                                                                               PLAN:       Discussed injection of A1 pulley-- we are going to try oral steroids.  Voltaren recommended as well.                                                                                                                                            There are no Patient Instructions on file for this visit.

## 2024-06-17 RX ORDER — NAPROXEN 500 MG/1
500 TABLET ORAL 2 TIMES DAILY WITH MEALS
Qty: 60 TABLET | Refills: 0 | Status: SHIPPED | OUTPATIENT
Start: 2024-06-17

## 2024-06-20 ENCOUNTER — PATIENT MESSAGE (OUTPATIENT)
Dept: ORTHOPEDICS | Facility: CLINIC | Age: 59
End: 2024-06-20
Payer: COMMERCIAL

## 2024-07-11 ENCOUNTER — OFFICE VISIT (OUTPATIENT)
Dept: ORTHOPEDICS | Facility: CLINIC | Age: 59
End: 2024-07-11
Payer: COMMERCIAL

## 2024-07-11 DIAGNOSIS — Z98.890 S/P TRIGGER FINGER RELEASE: Primary | ICD-10-CM

## 2024-07-11 PROCEDURE — 99999PBSHW PR PBB SHADOW TECHNICAL ONLY FILED TO HB: Mod: PBBFAC,,,

## 2024-07-11 PROCEDURE — 99213 OFFICE O/P EST LOW 20 MIN: CPT | Mod: PBBFAC | Performed by: ORTHOPAEDIC SURGERY

## 2024-07-11 PROCEDURE — 3044F HG A1C LEVEL LT 7.0%: CPT | Mod: ,,, | Performed by: ORTHOPAEDIC SURGERY

## 2024-07-11 PROCEDURE — 4010F ACE/ARB THERAPY RXD/TAKEN: CPT | Mod: ,,, | Performed by: ORTHOPAEDIC SURGERY

## 2024-07-11 PROCEDURE — 20550 NJX 1 TENDON SHEATH/LIGAMENT: CPT | Mod: PBBFAC,LT | Performed by: ORTHOPAEDIC SURGERY

## 2024-07-11 PROCEDURE — 99999 PR PBB SHADOW E&M-EST. PATIENT-LVL III: CPT | Mod: PBBFAC,,, | Performed by: ORTHOPAEDIC SURGERY

## 2024-07-11 PROCEDURE — 1159F MED LIST DOCD IN RCRD: CPT | Mod: ,,, | Performed by: ORTHOPAEDIC SURGERY

## 2024-07-11 PROCEDURE — 99024 POSTOP FOLLOW-UP VISIT: CPT | Mod: ,,, | Performed by: ORTHOPAEDIC SURGERY

## 2024-07-11 RX ADMIN — TRIAMCINOLONE ACETONIDE 20 MG: 400 INJECTION, SUSPENSION INTRA-ARTICULAR; INTRAMUSCULAR at 03:07

## 2024-07-11 NOTE — PROGRESS NOTES
HISTORY OF PRESENT ILLNESS:       Release, Trigger Finger - Right 4/10/2024       Pt is here today for Third post-operative followup of his Release, Trigger Finger - Right.      he reports he is still having a lot of pain with flexion and extension of his fingers.   He states that his strength is not adequate.   He states he has more pain when his hand is pronated.     He also reports some tingling in this hand and wrist.     We have reviewed his findings and discussed plan of care and future treatment options, including the physical therapy plan.                                                                                    PHYSICAL EXAMINATION:     Incision sites were inspected today.  There is no evidence of any erythema, infection or induration.  Around the site of the trigger finger release he does have adhesions present just radial and ulnar to the surgical incision.  Still has tenderness but no active triggering is demonstrated  Minimal effusion is present.  Patient is neurovascularly intact.   2+ radial and ulnar pulse pulses.        Imaging:         No results found.                                                                                 ASSESSMENT:                                                                                                                                               1. Status post above, doing well.                                                                                                                               PLAN:       Adhesions are fairly significant around the site of the surgery.  I recommended an injection today to help alleviate some of the swelling and adhesions which had been present postoperatively.  Please see attached procedure note.  I will see him back in 4 weeks for recheck                                                                               There are no Patient Instructions on file for this visit.

## 2024-07-15 RX ORDER — TRIAMCINOLONE ACETONIDE 40 MG/ML
20 INJECTION, SUSPENSION INTRA-ARTICULAR; INTRAMUSCULAR
Status: DISCONTINUED | OUTPATIENT
Start: 2024-07-11 | End: 2024-07-15 | Stop reason: HOSPADM

## 2024-07-15 NOTE — PROCEDURES
Tendon Sheath    Date/Time: 7/11/2024 3:00 PM    Performed by: Nick Asif MD  Authorized by: Nick Asif MD    Consent Done?:  Yes (Verbal)  Indications:  Pain  Local anesthetic:  Bupivacaine 0.25% without epinephrine  Location:  Ring finger  Site:  L ring flexor tendon sheath  Needle size:  22 G  Approach:  Volar  Medications:  20 mg triamcinolone acetonide 40 mg/mL  Patient tolerance:  Patient tolerated the procedure well with no immediate complications

## 2024-07-29 ENCOUNTER — PATIENT MESSAGE (OUTPATIENT)
Dept: ADMINISTRATIVE | Facility: HOSPITAL | Age: 59
End: 2024-07-29

## 2024-07-29 ENCOUNTER — PATIENT MESSAGE (OUTPATIENT)
Dept: ORTHOPEDICS | Facility: CLINIC | Age: 59
End: 2024-07-29
Payer: COMMERCIAL

## 2024-07-31 ENCOUNTER — PATIENT OUTREACH (OUTPATIENT)
Facility: HOSPITAL | Age: 59
End: 2024-07-31
Payer: COMMERCIAL

## 2024-07-31 NOTE — PROGRESS NOTES
Population Health Chart Review & Patient Outreach Details    Updates Requested / Reviewed:  [x]  Care Team Updated    Health Maintenance Topics Addressed and Outreach Outcomes / Actions Taken:  Diabetic Eye Exam [x] CARLOS sent to [facility].

## 2024-07-31 NOTE — LETTER
AUTHORIZATION FOR RELEASE OF   CONFIDENTIAL INFORMATION    Dear Tremaine Perez,    We are seeing Davi Talamantes, date of birth 1965, in the clinic at [facility]. Thomas Bradshaw DO is the patient's PCP. Davi Talamantes has an outstanding lab/procedure at the time we reviewed his chart. In order to help keep his health information updated, he has authorized us to request the following medical record(s):        (  )  MAMMOGRAM                                      (  )  COLONOSCOPY      (  )  PAP SMEAR                                          (  )  OUTSIDE LAB RESULTS     (  )  DEXA SCAN                                          ( x )  EYE EXAM            (  )  FOOT EXAM                                          (  )  ENTIRE RECORD     (  )  OUTSIDE IMMUNIZATIONS                 (  )  _______________         Please fax records to Olu Stacy LPN Care Coordinator at [phone].      If you have any questions, please contact Olu at [phone].           Patient Name: Davi Talamantes  : 1965  Patient Phone #: [phone]                Davi Talamantes  MRN: [id number]  : 1965  Age: 58 y.o.  Sex: male         Patient/Legal Guardian Signature  This signature was collected at 2023           _______________________________   Printed Name/Relationship to Patient      Consent for Examination and Treatment: I hereby authorize the providers and employees of [facility] ([facility]) to provide medical treatment/services which includes, but is not limited to, performing and administering tests and diagnostic procedures that are deemed necessary, including, but not limited to, imaging examinations, blood tests and other laboratory procedures as may be required by the hospital, clinic, or may be ordered by my physician(s) or persons working under the general and/or special instructions of my physician(s).      I understand and agree that this consent covers all authorized persons,  including but not limited to physicians, residents, nurse practitioners, physicians' assistants, specialists, consultants, student nurses, and independently contracted physicians, who are called upon by the physician in charge, to carry out the diagnostic procedures and medical or surgical treatment.     I hereby authorize [facility] to retain or dispose of any specimens or tissue, should there be such remaining from any test or procedure.     I hereby authorize and give consent for [facility] providers and employees to take photographs, images or videotapes of such diagnostic, surgical or treatment procedures of Patient as may be required by [facility] or as may be ordered by a physician. I further acknowledge and agree that [facility] may use cameras or other devices for patient monitoring.     I am aware that the practice of medicine is not an exact science, and I acknowledge that no guarantees have been made to me as to the outcome of any tests, procedures or treatment.     Authorization for Release of Information: I understand that my insurance company and/or their agents may need information necessary to make determinations about payment/reimbursement. I hereby provide authorization to release to all insurance companies, their successors, assignees, other parties with whom they may have contracted, or others acting on their behalf, that are involved with payment for any hospital and/or clinic charges incurred by the patient, any information that they request and deem necessary for payment/reimbursement, and/or quality review.  I further authorize the release of my health information to physicians or other health care practitioners on staff who are involved in my health care now and in the future, and to other health care providers, entities, or institutions for the purpose of my continued care and treatment, including referrals.     REGISTRATION AUTHORIZATION  Form No. 69518 (Rev. 7/13/2022)       Medicare Patient's  Certification and Authorization to Release Information and Payment Request:  I certify that the information given by me in applying for payment under Title XVIII of the Social Security Act is correct. I authorize any metzger of medical or other information about me to release to the Social SecurityAdministration, or its intermediaries or carriers, any information needed for this or a related Medicare claim. I request that payment of authorized benefits be made on my behalf.     Assignment of Insurance Benefits:   I hereby authorize any and all insurance companies, health plans, defined   benefit plans, health insurers or any entity that is or may be responsible for payment of my medical expenses to pay all hospital and medical benefits now due, and to become due and payable to me under any hospital benefits, sick benefits, injury benefits or any other benefit for services rendered to me, including Major Medical Benefits, direct to [facility] and all independently contracted physicians. I assign any and all rights that I may have against any and all insurance companies, health plans, defined benefit plans, health insurers or any entity that is or may be responsible for payment of my medical expenses, including, but not limited to any right to appeal a denial of a claim, any right to bring any action, lawsuit, administrative proceeding, or other cause of action on my behalf. I specifically assign my right to pursue litigation against any and all insurance companies, health plans, defined benefit plans, health insurers or any entity that is or may be responsible for payment of my medical expenses based upon a refusal to pay charges.            E. Valuables: It is understood and agreed that [facility] is not liable for the damage to or loss of any money, jewelry,   documents, dentures, eye glasses, hearing aids, prosthetics, or other property of value.     F. Computer Equipment: I understand and agree that should I choose to  use computer equipment owned by [facility] or if I choose to access the Internet via [facility]s network, I do so at my own risk. [facility] is not responsible for any damage to my computer equipment or to any damages of any type that might arise from my loss of equipment or data.     G. Acceptance of Financial Responsibility:  I agree that in consideration of the services and   supplies that have been   or will be furnished to the patient, I am hereby obligated to pay all charges made for or on the account of the patient according to the standard rates (in effect at the time the services and supplies are delivered) established by [facility], including its Patient Financial Assistance Policy to the extent it is applicable. I understand that I am responsible for all charges, or portions thereof, not covered by insurance or other sources. Patient refunds will be distributed only after balances at all [facility] facilities are paid.     H. Communication Authorization:  I hereby authorize [facility] and its representatives, along with any billing service   or  who may work on their behalf, to contact me on   my cell phone and/or home phone using pre- recorded messages, artificial voice messages, automatic telephone dialing devices or other computer assisted technology, or by electronic      mail, text messaging, or by any other form of electronic communication. This includes, but is not limited to, appointment reminders, yearly physical exam reminders, preventive care reminders, patient campaigns, welcome calls, and calls about account balances on my account or any account on which I am listed as a guarantor. I understand I have the right to opt out of these communications at any time.      Relationship  Between  Facility and  Provider:      I understand that some, but not all, providers furnishing services to the patient are not employees or agents of [facility]. The patient is under the care and supervision of his/her  attending physician, and it is the responsibility of the facility and its nursing staff to carry out the instructions of such physicians. It is the responsibility of the patient's physician/designee to obtain the patient's informed consent, when required, for medical or surgical treatment, special diagnostic or therapeutic procedures, or hospital services rendered for the patient under the special instructions of the physician/designee.     REGISTRATION AUTHORIZATION  Form No. 94887 (Rev. 7/13/2022)      Notice of Privacy Practices: I acknowledge I have received a copy of [facility]'s Notice of Privacy Practices.     Facility  Directory: I have discussed with the organization my desire to be either included or excluded  in the facility directory in the event of my being an inpatient at an [facility] facility. I understand that if my choice is to opt-out of being identified in the facility directory that the facility will not provide any information about me such as my condition (e.g. fair, stable, etc.) or my location in the facility (e.g., room number, department).     Immunizations: [facility] shares immunization information with state sponsored health departments to help you and your doctor keep track of your immunization records. By signing, you consent to have this information shared with the health department in your state:                                [place] - LINKS ([place] Immunization Network for Kids Statewide)                                [place] - MIIX ([place] Immunization Information eXchange)                                [place] - ImmPRINT (Immunization Patient Registry with Integrated Technology)     TERM: This authorization is valid for this and subsequent care/treatment I receive at [facility] and will remain valid unless/until revoked in writing by me.     [facility]: As used in this document, [facility] means all [facility] owned and managed facilities, including, but not  limited to, all health centers, surgery centers, clinics, urgent care centers, and hospitals.         [facility] complies with applicable Federal civil rights laws and does not discriminate on the basis of race, color, national origin, age, disability, or sex.  ATENCIÓN: si habla olyaañol, tiene a hampton disposición servicios gratuitos de asistencia lingüística. Sania al [phone].  GERARDO Ý: N?u b?n nói Ti?ng Vi?t, có các d?ch v? h? tr? ngôn ng? mi?n phí dành cho b?n. G?i s? [phone].        REGISTRATION AUTHORIZATION  Form No. 46168 (Rev. 7/13/2022)   Patient

## 2024-08-12 ENCOUNTER — OFFICE VISIT (OUTPATIENT)
Dept: INTERNAL MEDICINE | Facility: CLINIC | Age: 59
End: 2024-08-12
Payer: COMMERCIAL

## 2024-08-12 VITALS
BODY MASS INDEX: 38.36 KG/M2 | WEIGHT: 259 LBS | OXYGEN SATURATION: 96 % | HEART RATE: 85 BPM | DIASTOLIC BLOOD PRESSURE: 84 MMHG | RESPIRATION RATE: 18 BRPM | HEIGHT: 69 IN | SYSTOLIC BLOOD PRESSURE: 138 MMHG | TEMPERATURE: 98 F

## 2024-08-12 DIAGNOSIS — E11.9 TYPE 2 DIABETES MELLITUS WITHOUT COMPLICATION, WITHOUT LONG-TERM CURRENT USE OF INSULIN: ICD-10-CM

## 2024-08-12 DIAGNOSIS — E78.5 DYSLIPIDEMIA: ICD-10-CM

## 2024-08-12 DIAGNOSIS — M10.9 GOUT, UNSPECIFIED CAUSE, UNSPECIFIED CHRONICITY, UNSPECIFIED SITE: ICD-10-CM

## 2024-08-12 DIAGNOSIS — Z09 FOLLOW-UP EXAM: Primary | ICD-10-CM

## 2024-08-12 DIAGNOSIS — I10 ESSENTIAL HYPERTENSION: ICD-10-CM

## 2024-08-12 DIAGNOSIS — M65.341 TRIGGER FINGER, RIGHT RING FINGER: ICD-10-CM

## 2024-08-12 LAB — HBA1C MFR BLD: 6.7 % (ref 4.5–6.6)

## 2024-08-12 PROCEDURE — 99214 OFFICE O/P EST MOD 30 MIN: CPT | Mod: S$PBB,,, | Performed by: INTERNAL MEDICINE

## 2024-08-12 PROCEDURE — 99214 OFFICE O/P EST MOD 30 MIN: CPT | Mod: PBBFAC | Performed by: INTERNAL MEDICINE

## 2024-08-12 PROCEDURE — 3044F HG A1C LEVEL LT 7.0%: CPT | Mod: ,,, | Performed by: INTERNAL MEDICINE

## 2024-08-12 PROCEDURE — 99999PBSHW POCT HEMOGLOBIN A1C: Mod: PBBFAC,,,

## 2024-08-12 PROCEDURE — 1159F MED LIST DOCD IN RCRD: CPT | Mod: ,,, | Performed by: INTERNAL MEDICINE

## 2024-08-12 PROCEDURE — 3075F SYST BP GE 130 - 139MM HG: CPT | Mod: ,,, | Performed by: INTERNAL MEDICINE

## 2024-08-12 PROCEDURE — 4010F ACE/ARB THERAPY RXD/TAKEN: CPT | Mod: ,,, | Performed by: INTERNAL MEDICINE

## 2024-08-12 PROCEDURE — 99999 PR PBB SHADOW E&M-EST. PATIENT-LVL IV: CPT | Mod: PBBFAC,,, | Performed by: INTERNAL MEDICINE

## 2024-08-12 PROCEDURE — 3008F BODY MASS INDEX DOCD: CPT | Mod: ,,, | Performed by: INTERNAL MEDICINE

## 2024-08-12 PROCEDURE — 3079F DIAST BP 80-89 MM HG: CPT | Mod: ,,, | Performed by: INTERNAL MEDICINE

## 2024-08-12 PROCEDURE — 83036 HEMOGLOBIN GLYCOSYLATED A1C: CPT | Mod: PBBFAC | Performed by: INTERNAL MEDICINE

## 2024-08-12 RX ORDER — ALLOPURINOL 300 MG/1
300 TABLET ORAL DAILY
Qty: 90 TABLET | Refills: 3 | Status: SHIPPED | OUTPATIENT
Start: 2024-08-12 | End: 2025-08-12

## 2024-08-12 RX ORDER — OLMESARTAN MEDOXOMIL 20 MG/1
20 TABLET ORAL DAILY
Qty: 90 TABLET | Refills: 3 | Status: SHIPPED | OUTPATIENT
Start: 2024-08-12 | End: 2025-08-12

## 2024-08-12 RX ORDER — ATORVASTATIN CALCIUM 20 MG/1
20 TABLET, FILM COATED ORAL DAILY
Qty: 90 TABLET | Refills: 3 | Status: SHIPPED | OUTPATIENT
Start: 2024-08-12 | End: 2025-08-12

## 2024-08-12 NOTE — PROGRESS NOTES
Subjective:       Patient ID: Davi Talamantes is a 59 y.o. male.    Chief Complaint: Follow-up    The patient is a 60 yo male that presents today for follow-up.  He has a past medical history of DM II, hypertension, dyslipidemia, LYNNE, and trigger finger.  He had surgery about 4 months ago but is still having some issues.  Concern is for Dupuytren's contracture.  He was supposed to see Orthopedics this Thursday.  Blood sugars have been doing okay.  Blood pressure 135/84.  His weight is up a little bit but he states that he has been under a lot of stress and he tends to stress eat.  Eye exam and foot exam are both up-to-date.  He is resting comfortably today in no distress.  He is afebrile and vital signs are stable.      Follow-up  Associated symptoms include arthralgias. Pertinent negatives include no abdominal pain, chest pain, chills, coughing, fever, headaches, joint swelling, myalgias, nausea, neck pain, rash, sore throat, vomiting or weakness.     Review of Systems   Constitutional:  Negative for activity change, appetite change, chills, fever and unexpected weight change.   HENT:  Negative for ear pain, hearing loss, rhinorrhea, sinus pressure/congestion, sore throat and trouble swallowing.    Eyes:  Negative for pain, discharge, redness and visual disturbance.   Respiratory:  Negative for apnea, cough, chest tightness, shortness of breath and wheezing.    Cardiovascular:  Negative for chest pain, palpitations and leg swelling.   Gastrointestinal:  Negative for abdominal pain, blood in stool, constipation, diarrhea, nausea and vomiting.   Endocrine: Negative for cold intolerance, heat intolerance, polydipsia and polyuria.   Genitourinary:  Negative for difficulty urinating, dysuria, hematuria and urgency.   Musculoskeletal:  Positive for arthralgias. Negative for back pain, joint swelling, myalgias and neck pain.   Integumentary:  Negative for pallor and rash.   Allergic/Immunologic: Negative for frequent  infections.   Neurological:  Negative for tremors, seizures, weakness and headaches.   Hematological:  Negative for adenopathy.   Psychiatric/Behavioral:  Negative for confusion, dysphoric mood and sleep disturbance. The patient is not nervous/anxious.          Objective:      Physical Exam  Vitals and nursing note reviewed.   Constitutional:       General: He is not in acute distress.     Appearance: Normal appearance. He is obese. He is not ill-appearing.   HENT:      Head: Normocephalic and atraumatic.      Right Ear: External ear normal.      Left Ear: External ear normal.      Nose: Nose normal.      Mouth/Throat:      Pharynx: Oropharynx is clear.   Eyes:      Extraocular Movements: Extraocular movements intact.      Conjunctiva/sclera: Conjunctivae normal.      Pupils: Pupils are equal, round, and reactive to light.   Neck:      Vascular: No carotid bruit.   Cardiovascular:      Rate and Rhythm: Normal rate and regular rhythm.      Pulses: Normal pulses.      Heart sounds: Normal heart sounds. No murmur heard.  Pulmonary:      Effort: No respiratory distress.      Breath sounds: Normal breath sounds. No wheezing or rales.   Abdominal:      General: Bowel sounds are normal.      Palpations: Abdomen is soft.   Musculoskeletal:         General: No tenderness. Normal range of motion.      Cervical back: Normal range of motion and neck supple.      Right lower leg: No edema.      Left lower leg: No edema.   Skin:     General: Skin is warm and dry.      Capillary Refill: Capillary refill takes less than 2 seconds.      Coloration: Skin is not pale.   Neurological:      General: No focal deficit present.      Mental Status: He is alert and oriented to person, place, and time.      Cranial Nerves: No cranial nerve deficit.      Motor: No weakness.      Gait: Gait normal.   Psychiatric:         Mood and Affect: Mood normal.         Judgment: Judgment normal.         Assessment:       Problem List Items Addressed This  Visit          Cardiac/Vascular    Dyslipidemia    Relevant Orders    Lipid Panel    Essential hypertension       Endocrine    Type 2 diabetes mellitus without complication, without long-term current use of insulin    Relevant Medications    semaglutide (RYBELSUS) 14 mg tablet    Other Relevant Orders    Microalbumin/Creatinine Ratio, Urine       Orthopedic    Gout    Trigger finger, right ring finger     Other Visit Diagnoses       Follow-up exam    -  Primary              Plan:       1. The patient presents today for follow-up.  We have made referral for colonoscopy in the past but this has not been done yet.  We have discussed recommended vaccines.  He states that he has had Pneumovax, tetanus, shingles done in [facility].      2. Diabetes mellitus type 2-currently on 14 mg of Rybelsus.  We are going to check an A1c today.  Eye exam done back in June.  Foot exam with no lesions    3. Essential hypertension-  Blood pressure today 135/84.  He takes olmesartan 20 mg daily.    4. Dyslipidemia-  He takes 20 mg of atorvastatin.  I have ordered a lipid panel    5. Obstructive sleep apnea-he uses CPAP at night.    6. Polyarthralgias-secondary to osteoarthritis.  Currently stable with no acute issues    7. Dupuytren's contracture-surgery for trigger finger about 4 months ago.  He is continuing to have some issues in his felt that this is due to Dupuytren's contracture.  He has a follow-up with Orthopedics this Thursday    Billing based on moderate level of medical decision-making    Return to care in 6 months

## 2024-08-15 ENCOUNTER — OFFICE VISIT (OUTPATIENT)
Dept: ORTHOPEDICS | Facility: CLINIC | Age: 59
End: 2024-08-15
Payer: COMMERCIAL

## 2024-08-15 DIAGNOSIS — M65.30 TRIGGER FINGER OF RIGHT HAND, UNSPECIFIED FINGER: ICD-10-CM

## 2024-08-15 DIAGNOSIS — M72.0 DUPUYTREN'S CONTRACTURE: ICD-10-CM

## 2024-08-15 DIAGNOSIS — Z98.890 S/P TRIGGER FINGER RELEASE: Primary | ICD-10-CM

## 2024-08-15 DIAGNOSIS — Z01.818 PREPROCEDURAL EXAMINATION: Primary | ICD-10-CM

## 2024-08-15 PROCEDURE — 4010F ACE/ARB THERAPY RXD/TAKEN: CPT | Mod: ,,, | Performed by: ORTHOPAEDIC SURGERY

## 2024-08-15 PROCEDURE — 3044F HG A1C LEVEL LT 7.0%: CPT | Mod: ,,, | Performed by: ORTHOPAEDIC SURGERY

## 2024-08-15 PROCEDURE — 99214 OFFICE O/P EST MOD 30 MIN: CPT | Mod: S$PBB,,, | Performed by: ORTHOPAEDIC SURGERY

## 2024-08-15 PROCEDURE — 99213 OFFICE O/P EST LOW 20 MIN: CPT | Mod: PBBFAC | Performed by: ORTHOPAEDIC SURGERY

## 2024-08-15 PROCEDURE — 1159F MED LIST DOCD IN RCRD: CPT | Mod: ,,, | Performed by: ORTHOPAEDIC SURGERY

## 2024-08-15 PROCEDURE — 99999 PR PBB SHADOW E&M-EST. PATIENT-LVL III: CPT | Mod: PBBFAC,,, | Performed by: ORTHOPAEDIC SURGERY

## 2024-08-15 RX ORDER — CEFAZOLIN SODIUM 2 G/50ML
2 SOLUTION INTRAVENOUS
OUTPATIENT
Start: 2024-08-15

## 2024-08-15 RX ORDER — MUPIROCIN 20 MG/G
OINTMENT TOPICAL
OUTPATIENT
Start: 2024-08-15

## 2024-08-15 RX ORDER — SODIUM CHLORIDE 9 MG/ML
INJECTION, SOLUTION INTRAVENOUS CONTINUOUS
OUTPATIENT
Start: 2024-08-15

## 2024-08-15 NOTE — PROGRESS NOTES
59 y.o. Male returns to clinic for a follow up visit regarding     ICD-10-CM ICD-9-CM   1. S/P trigger finger release  Z98.890 V45.89   2. Dupuytren's contracture  M72.0 728.6        Patient is now 4 months post-op.     He had an injection at his last visit on 7/11/2024.   He reports that the injection did not help relieve his symptoms.  He is still having problems with swelling and holding/opening items.   He also still describes a throbbing pain at times.         Past Medical History:   Diagnosis Date    Diabetes mellitus, type 2      Past Surgical History:   Procedure Laterality Date    TONSILLECTOMY  1972    TRIGGER FINGER RELEASE Right 4/10/2024    Procedure: RELEASE, TRIGGER FINGER;  Surgeon: Nick Asif MD;  Location: [facility] OR;  Service: Orthopedics;  Laterality: Right;  RING         PHYSICAL EXAMINATION:    Ortho/SPM Exam  The right ring finger was once again inspected today.  There is swelling present radially and ulnarly adjacent to the previous surgical incision overlying the A1 pulley and the MCP joint of the ring finger no active triggering seen.  Does have limited extension of the MCP joint relative to the other digits lacks about 10° of full extension.    IMAGING:  No results found.     ASSESSMENT:      ICD-10-CM ICD-9-CM   1. S/P trigger finger release  Z98.890 V45.89   2. Dupuytren's contracture  M72.0 728.6       PLAN:     -Findings and treatment options were discussed with the patient  -All questions answered      Could have some component of Dupuytren's disease.  He is still quite painful and tender within this area is now been 4 months he has failed an injection we did a thorough release but at the time of surgery he did appear to have some contracted cords which may be more consistent with Dupuytren's disease.  I think at this point as reasonable were consider revision A1 pulley release versus Dupuytren's released.  He voices understanding of the treatment plan will plan for  surgical intervention in the near future.    We have discussed risks of hand surgery which include but are not limited to:  nerve or blood vessel damage;   blood clots in the legs that can travel to the lungs (pulmonary embolism);   Pulmonary embolism can cause shortness of breath, chest pain, and even shock.   Other risks include urinary tract infection, nausea and vomiting (usually related to pain medication), chronic pain and stiffness, bleeding and infection of the hand which can require re-operation. Furthermore, the risks of anesthesia include potential heart, lung, kidney, and liver damage.   he understands and is ready for the procedure.       There are no Patient Instructions on file for this visit.      No orders of the defined types were placed in this encounter.        Procedures

## 2024-08-15 NOTE — H&P (VIEW-ONLY)
59 y.o. Male returns to clinic for a follow up visit regarding     ICD-10-CM ICD-9-CM   1. S/P trigger finger release  Z98.890 V45.89   2. Dupuytren's contracture  M72.0 728.6        Patient is now 4 months post-op.     He had an injection at his last visit on 7/11/2024.   He reports that the injection did not help relieve his symptoms.  He is still having problems with swelling and holding/opening items.   He also still describes a throbbing pain at times.         Past Medical History:   Diagnosis Date    Diabetes mellitus, type 2      Past Surgical History:   Procedure Laterality Date    TONSILLECTOMY  1972    TRIGGER FINGER RELEASE Right 4/10/2024    Procedure: RELEASE, TRIGGER FINGER;  Surgeon: Nikc Asif MD;  Location: [facility] OR;  Service: Orthopedics;  Laterality: Right;  RING         PHYSICAL EXAMINATION:    Ortho/SPM Exam  The right ring finger was once again inspected today.  There is swelling present radially and ulnarly adjacent to the previous surgical incision overlying the A1 pulley and the MCP joint of the ring finger no active triggering seen.  Does have limited extension of the MCP joint relative to the other digits lacks about 10° of full extension.    IMAGING:  No results found.     ASSESSMENT:      ICD-10-CM ICD-9-CM   1. S/P trigger finger release  Z98.890 V45.89   2. Dupuytren's contracture  M72.0 728.6       PLAN:     -Findings and treatment options were discussed with the patient  -All questions answered      Could have some component of Dupuytren's disease.  He is still quite painful and tender within this area is now been 4 months he has failed an injection we did a thorough release but at the time of surgery he did appear to have some contracted cords which may be more consistent with Dupuytren's disease.  I think at this point as reasonable were consider revision A1 pulley release versus Dupuytren's released.  He voices understanding of the treatment plan will plan for  surgical intervention in the near future.    We have discussed risks of hand surgery which include but are not limited to:  nerve or blood vessel damage;   blood clots in the legs that can travel to the lungs (pulmonary embolism);   Pulmonary embolism can cause shortness of breath, chest pain, and even shock.   Other risks include urinary tract infection, nausea and vomiting (usually related to pain medication), chronic pain and stiffness, bleeding and infection of the hand which can require re-operation. Furthermore, the risks of anesthesia include potential heart, lung, kidney, and liver damage.   he understands and is ready for the procedure.       There are no Patient Instructions on file for this visit.      No orders of the defined types were placed in this encounter.        Procedures

## 2024-08-29 ENCOUNTER — TELEPHONE (OUTPATIENT)
Dept: ORTHOPEDICS | Facility: CLINIC | Age: 59
End: 2024-08-29
Payer: COMMERCIAL

## 2024-08-30 ENCOUNTER — ANESTHESIA EVENT (OUTPATIENT)
Dept: SURGERY | Facility: HOSPITAL | Age: 59
End: 2024-08-30
Payer: COMMERCIAL

## 2024-08-30 ENCOUNTER — HOSPITAL ENCOUNTER (OUTPATIENT)
Facility: HOSPITAL | Age: 59
Discharge: HOME | End: 2024-08-30
Attending: ORTHOPAEDIC SURGERY
Payer: COMMERCIAL

## 2024-08-30 ENCOUNTER — ANESTHESIA (OUTPATIENT)
Dept: SURGERY | Facility: HOSPITAL | Age: 59
End: 2024-08-30
Payer: COMMERCIAL

## 2024-08-30 VITALS
OXYGEN SATURATION: 83 % | TEMPERATURE: 99 F | HEART RATE: 72 BPM | RESPIRATION RATE: 15 BRPM | BODY MASS INDEX: 37.03 KG/M2 | WEIGHT: 250 LBS | DIASTOLIC BLOOD PRESSURE: 79 MMHG | SYSTOLIC BLOOD PRESSURE: 136 MMHG | HEIGHT: 69 IN

## 2024-08-30 DIAGNOSIS — Z98.890 S/P TRIGGER FINGER RELEASE: Primary | ICD-10-CM

## 2024-08-30 LAB
GLUCOSE SERPL-MCNC: 108 MG/DL (ref 70–105)
GLUCOSE SERPL-MCNC: 120 MG/DL (ref 70–105)

## 2024-08-30 PROCEDURE — 82962 GLUCOSE BLOOD TEST: CPT

## 2024-08-30 PROCEDURE — 27000716 HC OXISENSOR PROBE, ANY SIZE: Performed by: ANESTHESIOLOGY

## 2024-08-30 PROCEDURE — 63600175 PHARM REV CODE 636 W HCPCS: Performed by: NURSE ANESTHETIST, CERTIFIED REGISTERED

## 2024-08-30 PROCEDURE — 25000003 PHARM REV CODE 250: Performed by: ORTHOPAEDIC SURGERY

## 2024-08-30 PROCEDURE — 71000015 HC POSTOP RECOV 1ST HR: Performed by: ORTHOPAEDIC SURGERY

## 2024-08-30 PROCEDURE — 63600175 PHARM REV CODE 636 W HCPCS: Performed by: ANESTHESIOLOGY

## 2024-08-30 PROCEDURE — 71000033 HC RECOVERY, INTIAL HOUR: Performed by: ORTHOPAEDIC SURGERY

## 2024-08-30 PROCEDURE — 37000008 HC ANESTHESIA 1ST 15 MINUTES: Performed by: ORTHOPAEDIC SURGERY

## 2024-08-30 PROCEDURE — 36000706: Performed by: ORTHOPAEDIC SURGERY

## 2024-08-30 PROCEDURE — 36000707: Performed by: ORTHOPAEDIC SURGERY

## 2024-08-30 PROCEDURE — 37000009 HC ANESTHESIA EA ADD 15 MINS: Performed by: ORTHOPAEDIC SURGERY

## 2024-08-30 PROCEDURE — 25000003 PHARM REV CODE 250: Performed by: NURSE ANESTHETIST, CERTIFIED REGISTERED

## 2024-08-30 PROCEDURE — 27000510 HC BLANKET BAIR HUGGER ANY SIZE: Performed by: ANESTHESIOLOGY

## 2024-08-30 PROCEDURE — 63600175 PHARM REV CODE 636 W HCPCS: Mod: JZ,JG | Performed by: ORTHOPAEDIC SURGERY

## 2024-08-30 PROCEDURE — 27000177 HC AIRWAY, LARYNGEAL MASK: Performed by: ANESTHESIOLOGY

## 2024-08-30 PROCEDURE — 26055 INCISE FINGER TENDON SHEATH: CPT | Mod: F8,,, | Performed by: ORTHOPAEDIC SURGERY

## 2024-08-30 PROCEDURE — 71000016 HC POSTOP RECOV ADDL HR: Performed by: ORTHOPAEDIC SURGERY

## 2024-08-30 RX ORDER — OXYCODONE HYDROCHLORIDE 5 MG/1
5 TABLET ORAL EVERY 4 HOURS PRN
Status: DISCONTINUED | OUTPATIENT
Start: 2024-08-30 | End: 2024-08-30 | Stop reason: HOSPADM

## 2024-08-30 RX ORDER — ONDANSETRON 4 MG/1
8 TABLET, ORALLY DISINTEGRATING ORAL EVERY 8 HOURS PRN
Status: DISCONTINUED | OUTPATIENT
Start: 2024-08-30 | End: 2024-08-30 | Stop reason: HOSPADM

## 2024-08-30 RX ORDER — ACETAMINOPHEN 325 MG/1
650 TABLET ORAL EVERY 4 HOURS PRN
Status: DISCONTINUED | OUTPATIENT
Start: 2024-08-30 | End: 2024-08-30 | Stop reason: HOSPADM

## 2024-08-30 RX ORDER — DIPHENHYDRAMINE HYDROCHLORIDE 50 MG/ML
25 INJECTION INTRAMUSCULAR; INTRAVENOUS EVERY 6 HOURS PRN
Status: DISCONTINUED | OUTPATIENT
Start: 2024-08-30 | End: 2024-08-30 | Stop reason: HOSPADM

## 2024-08-30 RX ORDER — IPRATROPIUM BROMIDE AND ALBUTEROL SULFATE 2.5; .5 MG/3ML; MG/3ML
3 SOLUTION RESPIRATORY (INHALATION) ONCE
Status: DISCONTINUED | OUTPATIENT
Start: 2024-08-30 | End: 2024-08-30 | Stop reason: HOSPADM

## 2024-08-30 RX ORDER — ACETAMINOPHEN 10 MG/ML
1000 INJECTION, SOLUTION INTRAVENOUS ONCE
Status: COMPLETED | OUTPATIENT
Start: 2024-08-30 | End: 2024-08-30

## 2024-08-30 RX ORDER — PROPOFOL 10 MG/ML
VIAL (ML) INTRAVENOUS
Status: DISCONTINUED | OUTPATIENT
Start: 2024-08-30 | End: 2024-08-30

## 2024-08-30 RX ORDER — MUPIROCIN 20 MG/G
OINTMENT TOPICAL 2 TIMES DAILY
Status: DISCONTINUED | OUTPATIENT
Start: 2024-08-30 | End: 2024-08-30 | Stop reason: HOSPADM

## 2024-08-30 RX ORDER — HYDROMORPHONE HYDROCHLORIDE 2 MG/ML
0.5 INJECTION, SOLUTION INTRAMUSCULAR; INTRAVENOUS; SUBCUTANEOUS EVERY 5 MIN PRN
Status: COMPLETED | OUTPATIENT
Start: 2024-08-30 | End: 2024-08-30

## 2024-08-30 RX ORDER — KETOROLAC TROMETHAMINE 30 MG/ML
30 INJECTION, SOLUTION INTRAMUSCULAR; INTRAVENOUS ONCE
Status: COMPLETED | OUTPATIENT
Start: 2024-08-30 | End: 2024-08-30

## 2024-08-30 RX ORDER — ONDANSETRON HYDROCHLORIDE 2 MG/ML
INJECTION, SOLUTION INTRAVENOUS
Status: DISCONTINUED | OUTPATIENT
Start: 2024-08-30 | End: 2024-08-30

## 2024-08-30 RX ORDER — MORPHINE SULFATE 10 MG/ML
4 INJECTION INTRAMUSCULAR; INTRAVENOUS; SUBCUTANEOUS EVERY 5 MIN PRN
Status: DISCONTINUED | OUTPATIENT
Start: 2024-08-30 | End: 2024-08-30 | Stop reason: HOSPADM

## 2024-08-30 RX ORDER — ONDANSETRON HYDROCHLORIDE 2 MG/ML
4 INJECTION, SOLUTION INTRAVENOUS DAILY PRN
Status: DISCONTINUED | OUTPATIENT
Start: 2024-08-30 | End: 2024-08-30 | Stop reason: HOSPADM

## 2024-08-30 RX ORDER — BUPIVACAINE HYDROCHLORIDE 2.5 MG/ML
INJECTION, SOLUTION EPIDURAL; INFILTRATION; INTRACAUDAL
Status: DISCONTINUED | OUTPATIENT
Start: 2024-08-30 | End: 2024-08-30 | Stop reason: HOSPADM

## 2024-08-30 RX ORDER — LIDOCAINE HYDROCHLORIDE 20 MG/ML
INJECTION, SOLUTION EPIDURAL; INFILTRATION; INTRACAUDAL; PERINEURAL
Status: DISCONTINUED | OUTPATIENT
Start: 2024-08-30 | End: 2024-08-30

## 2024-08-30 RX ORDER — MUPIROCIN 20 MG/G
OINTMENT TOPICAL
Status: DISCONTINUED | OUTPATIENT
Start: 2024-08-30 | End: 2024-08-30 | Stop reason: HOSPADM

## 2024-08-30 RX ORDER — SODIUM CHLORIDE 9 MG/ML
INJECTION, SOLUTION INTRAVENOUS CONTINUOUS
Status: DISCONTINUED | OUTPATIENT
Start: 2024-08-30 | End: 2024-08-30 | Stop reason: HOSPADM

## 2024-08-30 RX ORDER — HYDROMORPHONE HYDROCHLORIDE 2 MG/ML
1 INJECTION, SOLUTION INTRAMUSCULAR; INTRAVENOUS; SUBCUTANEOUS EVERY 4 HOURS PRN
Status: DISCONTINUED | OUTPATIENT
Start: 2024-08-30 | End: 2024-08-30 | Stop reason: HOSPADM

## 2024-08-30 RX ORDER — MIDAZOLAM HYDROCHLORIDE 1 MG/ML
INJECTION INTRAMUSCULAR; INTRAVENOUS
Status: DISCONTINUED | OUTPATIENT
Start: 2024-08-30 | End: 2024-08-30

## 2024-08-30 RX ORDER — FENTANYL CITRATE 50 UG/ML
INJECTION, SOLUTION INTRAMUSCULAR; INTRAVENOUS
Status: DISCONTINUED | OUTPATIENT
Start: 2024-08-30 | End: 2024-08-30

## 2024-08-30 RX ORDER — OXYCODONE AND ACETAMINOPHEN 5; 325 MG/1; MG/1
1 TABLET ORAL EVERY 6 HOURS PRN
Qty: 15 TABLET | Refills: 0 | Status: SHIPPED | OUTPATIENT
Start: 2024-08-30

## 2024-08-30 RX ORDER — CEFAZOLIN SODIUM 1 G/3ML
INJECTION, POWDER, FOR SOLUTION INTRAMUSCULAR; INTRAVENOUS
Status: DISCONTINUED | OUTPATIENT
Start: 2024-08-30 | End: 2024-08-30

## 2024-08-30 RX ORDER — MEPERIDINE HYDROCHLORIDE 25 MG/ML
25 INJECTION INTRAMUSCULAR; INTRAVENOUS; SUBCUTANEOUS EVERY 10 MIN PRN
Status: DISCONTINUED | OUTPATIENT
Start: 2024-08-30 | End: 2024-08-30 | Stop reason: HOSPADM

## 2024-08-30 RX ORDER — ONDANSETRON 4 MG/1
4 TABLET, ORALLY DISINTEGRATING ORAL EVERY 8 HOURS PRN
Qty: 30 TABLET | Refills: 0 | Status: SHIPPED | OUTPATIENT
Start: 2024-08-30

## 2024-08-30 RX ADMIN — HYDROMORPHONE HYDROCHLORIDE 0.5 MG: 2 INJECTION, SOLUTION INTRAMUSCULAR; INTRAVENOUS; SUBCUTANEOUS at 12:08

## 2024-08-30 RX ADMIN — LIDOCAINE HYDROCHLORIDE 100 MG: 20 INJECTION, SOLUTION INTRAVENOUS at 11:08

## 2024-08-30 RX ADMIN — SODIUM CHLORIDE: 9 INJECTION, SOLUTION INTRAVENOUS at 09:08

## 2024-08-30 RX ADMIN — CEFAZOLIN 2 G: 1 INJECTION, POWDER, FOR SOLUTION INTRAMUSCULAR; INTRAVENOUS; PARENTERAL at 11:08

## 2024-08-30 RX ADMIN — MIDAZOLAM HYDROCHLORIDE 2 MG: 1 INJECTION, SOLUTION INTRAMUSCULAR; INTRAVENOUS at 11:08

## 2024-08-30 RX ADMIN — SODIUM CHLORIDE: 9 INJECTION, SOLUTION INTRAVENOUS at 11:08

## 2024-08-30 RX ADMIN — ACETAMINOPHEN 1000 MG: 10 INJECTION, SOLUTION INTRAVENOUS at 12:08

## 2024-08-30 RX ADMIN — ONDANSETRON 8 MG: 2 INJECTION INTRAMUSCULAR; INTRAVENOUS at 11:08

## 2024-08-30 RX ADMIN — FENTANYL CITRATE 100 MCG: 50 INJECTION, SOLUTION INTRAMUSCULAR; INTRAVENOUS at 11:08

## 2024-08-30 RX ADMIN — PROPOFOL 180 MG: 10 INJECTION, EMULSION INTRAVENOUS at 11:08

## 2024-08-30 RX ADMIN — KETOROLAC TROMETHAMINE 30 MG: 30 INJECTION, SOLUTION INTRAMUSCULAR at 12:08

## 2024-08-30 NOTE — OR NURSING
1148 REC'D TO PACU IN STABLE CONDITION. VSS. SATS 95% ON RA. RATES R HAND PAIN 9/10 ON PAIN SCALE AT THIS TIME. DSG TO R HAND C/D/I. WILL CONT TO MONITOR.    1235 TRANSFERRED TO [facility] #20 AWAKE IN STABLE CONDITION. REPORT GIVEN TO SHERYL BRITT. /94 HR 76 SATS 95% ON RA.

## 2024-08-30 NOTE — OP NOTE
[facility] - Orthopedic Periop Services  Operative Note    Surgery Date: 8/30/2024      Surgeon(s) and Role:     * Nick Asif MD - Primary    Assistant: ANNA Bansal    Pre-op Diagnosis:  Recurrent right ring trigger finger    Post-op Diagnosis: same    Procedure:  Procedure(s) (LRB):  RELEASE, TRIGGER FINGER (Right) (revision right ring trigger finger release)    Anesthesia:  LMA    EBL:  2cc    Implants: * No implants in log *    Tourniquet time: 8 mins    Complication:   none    Procedure: The patient was taken to the operating room and placedsupine.  Anesthesia was administered and pre-operative antibiotics were given.  A timeout was performed.  Patient was positioned appropriately and prepped and draped in the usual sterile fashion.    A 2.5 cm longitudinal incision was made overlying the A1 pulley of the digit.  This was slightly extended compared to what was previously encountered and an oblique orientation was created across the flexor crease.  Subcutaneous tissue was bluntly dissected to avoid injury to the digital nerves.  The A1 pulley was easily identified.  However the subcutaneous tissue the tissue was very thickened and scarred from previous surgery.  I meticulously released the subcutaneous tissue expose the A1 pulley released this in line with the incision as it appeared to have scarred back over.  Also had to perform an extensive synovectomy of the flexor tendon as there was thickened reddish synovium surrounding both the superficial and deep flexor tendons Following this, the patient had full excursion of the tendon with no further triggering.  All residual scar tissue was removed the tourniquet was released wound was copiously irrigated and closed with 4-0 nylon in interrupted fashion.      Disposition:awakened from anesthesia, extubated and taken to the recovery room in a stable condition, having suffered no apparent untoward event.

## 2024-08-30 NOTE — ANESTHESIA POSTPROCEDURE EVALUATION
Anesthesia Post Evaluation    Patient: Davi Talamantes    Procedure(s) Performed: Procedure(s) (LRB):  RELEASE, TRIGGER FINGER (Right)    Final Anesthesia Type: general      Patient location during evaluation: PACU  Patient participation: Yes- Able to Participate  Level of consciousness: awake and sedated  Post-procedure vital signs: reviewed and stable  Pain management: adequate  Airway patency: patent    PONV status at discharge: No PONV  Anesthetic complications: no      Cardiovascular status: blood pressure returned to baseline  Respiratory status: unassisted  Hydration status: euvolemic  Follow-up not needed.              Vitals Value Taken Time   /98 08/30/24 1245   Temp 37 °C (98.6 °F) 08/30/24 1151   Pulse 78 08/30/24 1246   Resp 15 08/30/24 1230   SpO2 88 % 08/30/24 1246   Vitals shown include unfiled device data.      Event Time   Out of Recovery 12:30:00         Pain/Chevy Score: Pain Rating Prior to Med Admin: 9 (8/30/2024 12:15 PM)  Chevy Score: 10 (8/30/2024 12:20 PM)

## 2024-08-30 NOTE — ANESTHESIA PREPROCEDURE EVALUATION
08/30/2024  Davi Talamantes is a 59 y.o., male.      Pre-op Assessment    I have reviewed the Patient Summary Reports.     I have reviewed the Nursing Notes. I have reviewed the NPO Status.   I have reviewed the Medications.     Review of Systems  Anesthesia Hx:             Denies Family Hx of Anesthesia complications.    Denies Personal Hx of Anesthesia complications.                    Social:  Non-Smoker, No Alcohol Use       Cardiovascular:     Hypertension                                        Pulmonary:        Sleep Apnea                Musculoskeletal:  Musculoskeletal Normal                Endocrine:  Diabetes         Obesity / BMI > 30      Chemistry        Component Value Date/Time     08/21/2024 0754    K 4.3 08/21/2024 0754     (H) 08/21/2024 0754    CO2 26 08/21/2024 0754    BUN 10 08/21/2024 0754    CREATININE 0.95 08/21/2024 0754     (H) 08/21/2024 0754        Component Value Date/Time    CALCIUM 8.9 08/21/2024 0754    ALKPHOS 70 03/05/2024 1134    AST 20 03/05/2024 1134    ALT 37 03/05/2024 1134    BILITOT 0.3 03/05/2024 1134        Lab Results   Component Value Date    WBC 7.46 03/05/2024    HGB 14.9 03/05/2024    HCT 44.3 03/05/2024     03/05/2024     Results for orders placed or performed in visit on 04/05/24   EKG 12-lead    Collection Time: 04/05/24  8:28 AM   Result Value Ref Range    QRS Duration 86 ms    OHS QTC Calculation 448 ms    Narrative    Test Reason : Z01.818,    Vent. Rate : 081 BPM     Atrial Rate : 081 BPM     P-R Int : 144 ms          QRS Dur : 086 ms      QT Int : 386 ms       P-R-T Axes : 028 -57 005 degrees     QTc Int : 448 ms    Normal sinus rhythm  Left anterior fascicular block  Minimal voltage criteria for LVH, may be normal variant ( R in aVL )  Abnormal ECG  No previous ECGs available  Confirmed by Kevin Pagan MD ([id number]) on 5/21/2024  6:30:50 AM    Referred By: GONSALO AYON           Confirmed By:Kevin Pagan MD            Anesthesia Plan  Type of Anesthesia, risks & benefits discussed:    Anesthesia Type: Gen Supraglottic Airway  Intra-op Monitoring Plan: Standard ASA Monitors  Post Op Pain Control Plan: multimodal analgesia  Induction:  IV  Airway Plan: Direct  Informed Consent: Informed consent signed with the Patient and all parties understand the risks and agree with anesthesia plan.  All questions answered.   ASA Score: 3  Day of Surgery Review of History & Physical: H&P Update referred to the surgeon/provider.I have interviewed and examined the patient. I have reviewed the patient's H&P dated: There are no significant changes.     Ready For Surgery From Anesthesia Perspective.     .

## 2024-09-06 NOTE — DISCHARGE SUMMARY
[facility] - Orthopedic Periop Services  Discharge Note  Short Stay    Procedure(s) (LRB):  RELEASE, TRIGGER FINGER (Right)      OUTCOME: Patient tolerated treatment/procedure well without complication and is now ready for discharge.    DISPOSITION: Home or Self Care    FINAL DIAGNOSIS:  Trigger finger, right ring finger    FOLLOWUP: In clinic    DISCHARGE INSTRUCTIONS:    Discharge Procedure Orders   Diet general     Keep surgical extremity elevated     Leave dressing on - Keep it clean, dry, and intact until clinic visit     Call MD for:  temperature >100.4     Call MD for:  persistent nausea and vomiting     Call MD for:  severe uncontrolled pain     Call MD for:  difficulty breathing, headache or visual disturbances     Call MD for:  redness, tenderness, or signs of infection (pain, swelling, redness, odor or green/yellow discharge around incision site)     Call MD for:  hives     Call MD for:  persistent dizziness or light-headedness     Call MD for:  extreme fatigue         Clinical Reference Documents Added to Patient Instructions         Document    TRIGGER FINGER RELEASE DISCHARGE INSTRUCTIONS (ENGLISH)            TIME SPENT ON DISCHARGE: 9 minutes

## 2024-09-13 ENCOUNTER — OFFICE VISIT (OUTPATIENT)
Dept: ORTHOPEDICS | Facility: CLINIC | Age: 59
End: 2024-09-13
Payer: COMMERCIAL

## 2024-09-13 DIAGNOSIS — Z98.890 S/P TRIGGER FINGER RELEASE: Primary | ICD-10-CM

## 2024-09-13 PROCEDURE — 99999 PR PBB SHADOW E&M-EST. PATIENT-LVL III: CPT | Mod: PBBFAC,,, | Performed by: NURSE PRACTITIONER

## 2024-09-13 PROCEDURE — 99024 POSTOP FOLLOW-UP VISIT: CPT | Mod: ,,, | Performed by: NURSE PRACTITIONER

## 2024-09-13 PROCEDURE — 99213 OFFICE O/P EST LOW 20 MIN: CPT | Mod: PBBFAC | Performed by: NURSE PRACTITIONER

## 2024-09-13 PROCEDURE — 1159F MED LIST DOCD IN RCRD: CPT | Mod: ,,, | Performed by: NURSE PRACTITIONER

## 2024-09-13 PROCEDURE — 4010F ACE/ARB THERAPY RXD/TAKEN: CPT | Mod: ,,, | Performed by: NURSE PRACTITIONER

## 2024-09-13 PROCEDURE — 3044F HG A1C LEVEL LT 7.0%: CPT | Mod: ,,, | Performed by: NURSE PRACTITIONER

## 2024-09-13 NOTE — PROGRESS NOTES
HISTORY OF PRESENT ILLNESS:       Release, Trigger Finger - Right 8/30/2024       Pt is here today for First post-operative followup of his No surgery found.    Patient is 2 weeks postop trigger finger release on the right.  Incision looks good today.  Sutures removed and Steri-Strips applied.  He does have some tenderness around his incision.  No signs of infection.  he is doing well.      We have reviewed his findings and discussed plan of care and future treatment options, including the physical therapy plan.                                                                                    PHYSICAL EXAMINATION:     Incision sites were inspected today.  There is no evidence of any erythema, infection or induration  Minimal effusion is present.  Patient is neurovascularly intact.   2+ radial and ulnar pulse pulses.        Imaging:         No results found.                                                                                 ASSESSMENT:                                                                                                                                               1. Status post above, doing well.                                                                                                                               PLAN:       Wounds were treated today and a discussion of weight-bearing status was had with the patient.    Therapy plan discussed in great detail today; all questions answered.   Home exercises were prescribed                                                                           Wound care and limitations discussed today.  We will have him return to clinic in 4 weeks for recheck.                                                                 There are no Patient Instructions on file for this visit.

## 2024-09-18 ENCOUNTER — PATIENT MESSAGE (OUTPATIENT)
Dept: INTERNAL MEDICINE | Facility: CLINIC | Age: 59
End: 2024-09-18
Payer: COMMERCIAL

## 2024-09-20 RX ORDER — GABAPENTIN 100 MG/1
100 CAPSULE ORAL 3 TIMES DAILY
Qty: 90 CAPSULE | Refills: 5 | Status: SHIPPED | OUTPATIENT
Start: 2024-09-20 | End: 2025-09-20

## 2024-10-11 ENCOUNTER — OFFICE VISIT (OUTPATIENT)
Dept: ORTHOPEDICS | Facility: CLINIC | Age: 59
End: 2024-10-11
Payer: COMMERCIAL

## 2024-10-11 DIAGNOSIS — Z98.890 S/P TRIGGER FINGER RELEASE: Primary | ICD-10-CM

## 2024-10-11 PROCEDURE — 4010F ACE/ARB THERAPY RXD/TAKEN: CPT | Mod: ,,, | Performed by: NURSE PRACTITIONER

## 2024-10-11 PROCEDURE — 99999 PR PBB SHADOW E&M-EST. PATIENT-LVL III: CPT | Mod: PBBFAC,,, | Performed by: NURSE PRACTITIONER

## 2024-10-11 PROCEDURE — 99213 OFFICE O/P EST LOW 20 MIN: CPT | Mod: PBBFAC | Performed by: NURSE PRACTITIONER

## 2024-10-11 PROCEDURE — 3044F HG A1C LEVEL LT 7.0%: CPT | Mod: ,,, | Performed by: NURSE PRACTITIONER

## 2024-10-11 PROCEDURE — 99024 POSTOP FOLLOW-UP VISIT: CPT | Mod: ,,, | Performed by: NURSE PRACTITIONER

## 2024-10-11 PROCEDURE — 1159F MED LIST DOCD IN RCRD: CPT | Mod: ,,, | Performed by: NURSE PRACTITIONER

## 2024-10-11 RX ORDER — DICLOFENAC SODIUM 75 MG/1
75 TABLET, DELAYED RELEASE ORAL 2 TIMES DAILY
Qty: 60 TABLET | Refills: 0 | Status: SHIPPED | OUTPATIENT
Start: 2024-10-11

## 2024-10-11 NOTE — PROGRESS NOTES
HISTORY OF PRESENT ILLNESS:       Release, Trigger Finger - Right 8/30/2024       Pt is here today for Second post-operative followup of his No surgery found.    Patient is 6 weeks postop right hand trigger finger release.  he is still having pain and tenderness.  His incision is well healed.  He reports he is very tender.  Still some mild swelling.  He states he has found that he clinches his fist at night and when he wakes up and straightens his hand out it is extremely pain.   Hehas not had any occupational therapy at this time.     We have reviewed his findings and discussed plan of care and future treatment options, including the physical therapy plan.                                                                                    PHYSICAL EXAMINATION:     Incision sites were inspected today.  There is no evidence of any erythema, infection or induration  Minimal effusion is present.  Patient is neurovascularly intact.   2+ radial and ulnar pulse pulses.        Imaging:       No results found.                                                                                 ASSESSMENT:                                                                                                                                               1. Status post above, doing well.                                                                                                                               PLAN:       Wounds were treated today and a discussion of weight-bearing status was had with the patient.    Therapy plan discussed in great detail today; all questions answered.   Home exercises were prescribed           Diclofenac p.o. b.i.d..  We will set him up OT at [facility].  Return to clinic in 6 weeks with Dr. Asif                                                                                                                                   There are no Patient Instructions on file for this visit.

## 2024-10-17 ENCOUNTER — TELEPHONE (OUTPATIENT)
Dept: ORTHOPEDICS | Facility: CLINIC | Age: 59
End: 2024-10-17
Payer: COMMERCIAL

## 2024-10-17 DIAGNOSIS — Z98.890 S/P TRIGGER FINGER RELEASE: Primary | ICD-10-CM

## 2024-10-17 NOTE — TELEPHONE ENCOUNTER
NEW ORDER FAXED    ----- Message from Ginette sent at 10/17/2024  2:17 PM CDT -----  Marsha from [facility] calling. Pt has appt shortly. The order is for Occupational therapy and needs to be Physical therapy. Please fax to [phone].

## 2024-11-09 DIAGNOSIS — E53.8 VITAMIN B12 DEFICIENCY: ICD-10-CM

## 2024-11-11 ENCOUNTER — TELEPHONE (OUTPATIENT)
Dept: NEUROLOGY | Facility: CLINIC | Age: 59
End: 2024-11-11

## 2024-11-11 RX ORDER — CYANOCOBALAMIN 1000 UG/ML
INJECTION, SOLUTION INTRAMUSCULAR; SUBCUTANEOUS
Qty: 3 ML | Refills: 1 | Status: SHIPPED | OUTPATIENT
Start: 2024-11-11

## 2024-11-26 ENCOUNTER — OFFICE VISIT (OUTPATIENT)
Dept: ORTHOPEDICS | Facility: CLINIC | Age: 59
End: 2024-11-26
Payer: COMMERCIAL

## 2024-11-26 DIAGNOSIS — Z98.890 S/P TRIGGER FINGER RELEASE: Primary | ICD-10-CM

## 2024-11-26 DIAGNOSIS — G56.00 CARPAL TUNNEL SYNDROME, UNSPECIFIED LATERALITY: ICD-10-CM

## 2024-11-26 PROCEDURE — 99999 PR PBB SHADOW E&M-EST. PATIENT-LVL III: CPT | Mod: PBBFAC,,, | Performed by: ORTHOPAEDIC SURGERY

## 2024-11-26 PROCEDURE — 99213 OFFICE O/P EST LOW 20 MIN: CPT | Mod: PBBFAC | Performed by: ORTHOPAEDIC SURGERY

## 2024-11-26 NOTE — PROGRESS NOTES
ASSESSMENT:      ICD-10-CM ICD-9-CM   1. S/P trigger finger release  Z98.890 V45.89       PLAN:     -Findings and treatment options were discussed with the patient  -All questions answered  EMG/NCS ordered. Concerned for cubital tunnel  Was in an accident in [place]. Seen at [facility].  OT/PT had been completed there in 2018 after a fall.  He is wondering if this is may be related to his constellation of symptoms    There are no Patient Instructions on file for this visit.    IMAGING:  No results found.                   CC:  Hand/wrist pain    59 y.o. Male returns to clinic for a follow up visit regarding his right hand.  He continues to complain of difficulty gripping objects unable to open jars having issues with day-to-day activities.  Complaining of diminished  strength           REVIEW OF SYSTEMS:   Constitution: Negative. Negative for chills, fever and night sweats.    Hematologic/Lymphatic: Negative for bleeding problem. Does not bruise/bleed easily.   Skin: Negative for dry skin, itching and rash.   Musculoskeletal: Negative for falls. Positive for hand/wrist pain and muscle weakness.     All other review of symptoms were reviewed and found to be noncontributory.     PAST MEDICAL HISTORY:   Past Medical History:   Diagnosis Date    Diabetes mellitus, type 2     Hypertension        PAST SURGICAL HISTORY:   Past Surgical History:   Procedure Laterality Date    TONSILLECTOMY  1972    TRIGGER FINGER RELEASE Right 4/10/2024    Procedure: RELEASE, TRIGGER FINGER;  Surgeon: Nick Asif MD;  Location: [facility];  Service: Orthopedics;  Laterality: Right;  RING    TRIGGER FINGER RELEASE Right 8/30/2024    Procedure: RELEASE, TRIGGER FINGER;  Surgeon: Nick Asif MD;  Location: [facility] OR;  Service: Orthopedics;  Laterality: Right;  RING       FAMILY HISTORY:   Family History   Problem Relation Name Age of Onset    Cancer Mother Lnidsay Talamantes Maura         Ovarian Cancer     Diabetes Mother Lindsay Lorenzo     Stroke Mother Lindsay Lorenzo     Arthritis Father Temo     Depression Father Temo     Alcohol abuse Maternal Grandfather Joni Beckett     Heart disease Maternal Grandmother Emilia     Stroke Maternal Grandmother Emilia     Asthma Daughter Bernie     Birth defects Daughter Bernie         Down Syndrome w/VSD    Early death Daughter Bernie     Learning disabilities Daughter Bernie         Down Syndrome    Intellectual disability Daughter Bernie     Cancer Sister Cece Talamantes     Diabetes Sister Cece Talamantes        SOCIAL HISTORY:   Social History     Socioeconomic History    Marital status: Single   Tobacco Use    Smoking status: Former     Current packs/day: 0.00     Types: Cigarettes     Start date: 10/18/2002     Quit date: 2004     Years since quittin.4     Passive exposure: Never    Smokeless tobacco: Never    Tobacco comments:     Crutch when wife . Stopped cold turkey   Substance and Sexual Activity    Alcohol use: Not Currently    Drug use: Never    Sexual activity: Not Currently     Partners: Female, Male     Birth control/protection: Abstinence, Condom     Social Drivers of Health     Financial Resource Strain: Low Risk  (2023)    Overall Financial Resource Strain (CARDIA)     Difficulty of Paying Living Expenses: Not hard at all   Food Insecurity: No Food Insecurity (2023)    Hunger Vital Sign     Worried About Running Out of Food in the Last Year: Never true     Ran Out of Food in the Last Year: Never true   Transportation Needs: No Transportation Needs (2023)    PRAPARE - Transportation     Lack of Transportation (Medical): No     Lack of Transportation (Non-Medical): No   Physical Activity: Insufficiently Active (2023)    Exercise Vital Sign     Days of Exercise per Week: 2 days     Minutes of Exercise per Session: 60 min   Stress: Stress Concern Present (2023)    [employer] [place] [employer]  [employer] - Occupational Stress Questionnaire     Feeling of Stress : Very much   Housing Stability: Patient Declined (12/27/2023)    Housing Stability Vital Sign     Unable to Pay for Housing in the Last Year: Patient declined     Unstable Housing in the Last Year: Patient declined       MEDICATIONS:     Current Outpatient Medications:     allopurinoL (ZYLOPRIM) 300 MG tablet, Take 1 tablet (300 mg total) by mouth once daily., Disp: 90 tablet, Rfl: 3    atorvastatin (LIPITOR) 20 MG tablet, Take 1 tablet (20 mg total) by mouth once daily., Disp: 90 tablet, Rfl: 3    cyanocobalamin 1,000 mcg/mL injection, ADMINISTER 1 ML(1000 MCG) IN THE MUSCLE EVERY 30 DAYS, Disp: 3 mL, Rfl: 1    diclofenac (VOLTAREN) 75 MG EC tablet, Take 1 tablet (75 mg total) by mouth 2 (two) times daily., Disp: 60 tablet, Rfl: 0    gabapentin (NEURONTIN) 100 MG capsule, Take 1 capsule (100 mg total) by mouth 3 (three) times daily., Disp: 90 capsule, Rfl: 5    olmesartan (BENICAR) 20 MG tablet, Take 1 tablet (20 mg total) by mouth once daily., Disp: 90 tablet, Rfl: 3    ondansetron (ZOFRAN-ODT) 4 MG TbDL, Take 1 tablet (4 mg total) by mouth every 8 (eight) hours as needed., Disp: 10 tablet, Rfl: 0    ondansetron (ZOFRAN-ODT) 4 MG TbDL, Take 1 tablet (4 mg total) by mouth every 8 (eight) hours as needed for nausea.. May cause drowsiness, Disp: 30 tablet, Rfl: 0    oxyCODONE-acetaminophen (PERCOCET) 5-325 mg per tablet, Take 1 tablet by mouth every 6 (six) hours as needed for pain.. May cause drowsiness, Disp: 15 tablet, Rfl: 0    semaglutide (RYBELSUS) 14 mg tablet, Take 1 tablet (14 mg total) by mouth once daily., Disp: 90 tablet, Rfl: 3    traMADoL (ULTRAM) 50 mg tablet, Take 1 tablet (50 mg total) by mouth every 6 (six) hours as needed for Pain., Disp: 40 tablet, Rfl: 1    ALLERGIES:   Review of patient's allergies indicates:  No Known Allergies     PHYSICAL EXAMINATION:  There were no vitals taken for this visit.  Ortho/SPM  Exam  Right hand was inspected today.  Well-healed surgical incision no active triggering seen.  Diminished  strength is demonstrated.  It complaining of subjective numbness over the small finger ring finger and long finger.   strength mildly diminished compared to the opposite side.  Positive Tinel's at the elbow      No orders of the defined types were placed in this encounter.      Procedures

## 2024-12-13 ENCOUNTER — TELEPHONE (OUTPATIENT)
Dept: ORTHOPEDICS | Facility: CLINIC | Age: 59
End: 2024-12-13
Payer: COMMERCIAL

## 2024-12-13 NOTE — TELEPHONE ENCOUNTER
----- Message from Tayler sent at 12/13/2024 10:39 AM CST -----  Regarding: patient call back for appointment  Who Called: Davi Talamantes    Patient is returning phone call    Who Left Message for Patient: Nae  Does the patient know what this is regarding?: appointment      Preferred Method of Contact: Phone Call  Patient's Preferred Phone Number on File: [phone]     Additional Information:  patient was on phone and is returning call to Nae for FU appointment with Nick Asif.

## 2024-12-19 ENCOUNTER — PATIENT MESSAGE (OUTPATIENT)
Dept: FAMILY MEDICINE | Facility: CLINIC | Age: 59
End: 2024-12-19
Payer: COMMERCIAL

## 2024-12-19 ENCOUNTER — PATIENT MESSAGE (OUTPATIENT)
Dept: ORTHOPEDICS | Facility: CLINIC | Age: 59
End: 2024-12-19
Payer: COMMERCIAL

## 2024-12-19 DIAGNOSIS — R05.1 ACUTE COUGH: Primary | ICD-10-CM

## 2024-12-20 RX ORDER — CODEINE PHOSPHATE AND GUAIFENESIN 10; 100 MG/5ML; MG/5ML
5 SOLUTION ORAL EVERY 6 HOURS PRN
Qty: 150 ML | Refills: 0 | Status: SHIPPED | OUTPATIENT
Start: 2024-12-20 | End: 2024-12-30

## 2025-02-21 DIAGNOSIS — Z01.810 PREOP CARDIOVASCULAR EXAM: Primary | ICD-10-CM

## 2025-02-25 ENCOUNTER — PATIENT MESSAGE (OUTPATIENT)
Dept: ADMINISTRATIVE | Facility: HOSPITAL | Age: 60
End: 2025-02-25

## 2025-02-25 ENCOUNTER — CLINICAL SUPPORT (OUTPATIENT)
Dept: CARDIOLOGY | Facility: CLINIC | Age: 60
End: 2025-02-25
Payer: COMMERCIAL

## 2025-02-25 DIAGNOSIS — Z01.810 PREOP CARDIOVASCULAR EXAM: ICD-10-CM

## 2025-02-25 PROCEDURE — 99212 OFFICE O/P EST SF 10 MIN: CPT | Mod: PBBFAC

## 2025-02-25 PROCEDURE — 99999 PR PBB SHADOW E&M-EST. PATIENT-LVL II: CPT | Mod: PBBFAC,,,

## 2025-02-26 DIAGNOSIS — Z12.11 SCREENING FOR COLON CANCER: ICD-10-CM

## 2025-02-28 ENCOUNTER — HOSPITAL ENCOUNTER (OUTPATIENT)
Facility: HOSPITAL | Age: 60
Discharge: HOME | End: 2025-02-28
Attending: ORTHOPAEDIC SURGERY | Admitting: ORTHOPAEDIC SURGERY
Payer: COMMERCIAL

## 2025-02-28 ENCOUNTER — ANESTHESIA (OUTPATIENT)
Dept: SURGERY | Facility: HOSPITAL | Age: 60
End: 2025-02-28
Payer: COMMERCIAL

## 2025-02-28 ENCOUNTER — ANESTHESIA EVENT (OUTPATIENT)
Dept: SURGERY | Facility: HOSPITAL | Age: 60
End: 2025-02-28
Payer: COMMERCIAL

## 2025-02-28 VITALS
SYSTOLIC BLOOD PRESSURE: 135 MMHG | BODY MASS INDEX: 37.77 KG/M2 | TEMPERATURE: 98 F | DIASTOLIC BLOOD PRESSURE: 80 MMHG | HEIGHT: 69 IN | OXYGEN SATURATION: 94 % | HEART RATE: 73 BPM | RESPIRATION RATE: 16 BRPM | WEIGHT: 255 LBS

## 2025-02-28 DIAGNOSIS — G56.01 CARPAL TUNNEL SYNDROME ON RIGHT: Primary | ICD-10-CM

## 2025-02-28 LAB
GLUCOSE SERPL-MCNC: 118 MG/DL (ref 70–105)
GLUCOSE SERPL-MCNC: 118 MG/DL (ref 70–105)

## 2025-02-28 PROCEDURE — 63600175 PHARM REV CODE 636 W HCPCS: Performed by: ANESTHESIOLOGY

## 2025-02-28 PROCEDURE — D9220A PRA ANESTHESIA: Mod: CRNA,,, | Performed by: NURSE ANESTHETIST, CERTIFIED REGISTERED

## 2025-02-28 PROCEDURE — 63600175 PHARM REV CODE 636 W HCPCS: Performed by: ORTHOPAEDIC SURGERY

## 2025-02-28 PROCEDURE — 71000033 HC RECOVERY, INTIAL HOUR: Performed by: ORTHOPAEDIC SURGERY

## 2025-02-28 PROCEDURE — 36000707: Performed by: ORTHOPAEDIC SURGERY

## 2025-02-28 PROCEDURE — 63600175 PHARM REV CODE 636 W HCPCS: Performed by: NURSE ANESTHETIST, CERTIFIED REGISTERED

## 2025-02-28 PROCEDURE — 37000009 HC ANESTHESIA EA ADD 15 MINS: Performed by: ORTHOPAEDIC SURGERY

## 2025-02-28 PROCEDURE — 25000003 PHARM REV CODE 250: Performed by: NURSE ANESTHETIST, CERTIFIED REGISTERED

## 2025-02-28 PROCEDURE — D9220A PRA ANESTHESIA: Mod: ANES,,, | Performed by: ANESTHESIOLOGY

## 2025-02-28 PROCEDURE — 27000177 HC AIRWAY, LARYNGEAL MASK: Performed by: NURSE ANESTHETIST, CERTIFIED REGISTERED

## 2025-02-28 PROCEDURE — 37000008 HC ANESTHESIA 1ST 15 MINUTES: Performed by: ORTHOPAEDIC SURGERY

## 2025-02-28 PROCEDURE — 82962 GLUCOSE BLOOD TEST: CPT

## 2025-02-28 PROCEDURE — 71000015 HC POSTOP RECOV 1ST HR: Performed by: ORTHOPAEDIC SURGERY

## 2025-02-28 PROCEDURE — 27000655: Performed by: NURSE ANESTHETIST, CERTIFIED REGISTERED

## 2025-02-28 PROCEDURE — 25000003 PHARM REV CODE 250: Performed by: ORTHOPAEDIC SURGERY

## 2025-02-28 PROCEDURE — 71000039 HC RECOVERY, EACH ADD'L HOUR: Performed by: ORTHOPAEDIC SURGERY

## 2025-02-28 PROCEDURE — 71000016 HC POSTOP RECOV ADDL HR: Performed by: ORTHOPAEDIC SURGERY

## 2025-02-28 PROCEDURE — 27000510 HC BLANKET BAIR HUGGER ANY SIZE: Performed by: NURSE ANESTHETIST, CERTIFIED REGISTERED

## 2025-02-28 PROCEDURE — 36000706: Performed by: ORTHOPAEDIC SURGERY

## 2025-02-28 PROCEDURE — 27000716 HC OXISENSOR PROBE, ANY SIZE: Performed by: NURSE ANESTHETIST, CERTIFIED REGISTERED

## 2025-02-28 PROCEDURE — 27201423 OPTIME MED/SURG SUP & DEVICES STERILE SUPPLY: Performed by: ORTHOPAEDIC SURGERY

## 2025-02-28 RX ORDER — ONDANSETRON HYDROCHLORIDE 2 MG/ML
INJECTION, SOLUTION INTRAVENOUS
Status: DISCONTINUED | OUTPATIENT
Start: 2025-02-28 | End: 2025-02-28

## 2025-02-28 RX ORDER — DEXMEDETOMIDINE HYDROCHLORIDE 100 UG/ML
INJECTION, SOLUTION INTRAVENOUS
Status: DISCONTINUED | OUTPATIENT
Start: 2025-02-28 | End: 2025-02-28

## 2025-02-28 RX ORDER — MIDAZOLAM HYDROCHLORIDE 1 MG/ML
INJECTION INTRAMUSCULAR; INTRAVENOUS
Status: DISCONTINUED | OUTPATIENT
Start: 2025-02-28 | End: 2025-02-28

## 2025-02-28 RX ORDER — BUPIVACAINE HYDROCHLORIDE 2.5 MG/ML
INJECTION, SOLUTION EPIDURAL; INFILTRATION; INTRACAUDAL
Status: DISCONTINUED | OUTPATIENT
Start: 2025-02-28 | End: 2025-02-28 | Stop reason: HOSPADM

## 2025-02-28 RX ORDER — FENTANYL CITRATE 50 UG/ML
INJECTION, SOLUTION INTRAMUSCULAR; INTRAVENOUS
Status: DISCONTINUED | OUTPATIENT
Start: 2025-02-28 | End: 2025-02-28

## 2025-02-28 RX ORDER — OXYCODONE HYDROCHLORIDE 5 MG/1
10 TABLET ORAL EVERY 4 HOURS PRN
Status: DISCONTINUED | OUTPATIENT
Start: 2025-02-28 | End: 2025-02-28 | Stop reason: HOSPADM

## 2025-02-28 RX ORDER — LIDOCAINE HYDROCHLORIDE 20 MG/ML
INJECTION, SOLUTION EPIDURAL; INFILTRATION; INTRACAUDAL; PERINEURAL
Status: DISCONTINUED | OUTPATIENT
Start: 2025-02-28 | End: 2025-02-28

## 2025-02-28 RX ORDER — MORPHINE SULFATE 10 MG/ML
4 INJECTION INTRAMUSCULAR; INTRAVENOUS; SUBCUTANEOUS EVERY 5 MIN PRN
Status: DISCONTINUED | OUTPATIENT
Start: 2025-02-28 | End: 2025-02-28 | Stop reason: HOSPADM

## 2025-02-28 RX ORDER — ONDANSETRON HYDROCHLORIDE 2 MG/ML
4 INJECTION, SOLUTION INTRAVENOUS DAILY PRN
Status: DISCONTINUED | OUTPATIENT
Start: 2025-02-28 | End: 2025-02-28 | Stop reason: HOSPADM

## 2025-02-28 RX ORDER — OXYCODONE AND ACETAMINOPHEN 10; 325 MG/1; MG/1
1 TABLET ORAL EVERY 6 HOURS PRN
Qty: 30 TABLET | Refills: 0 | Status: SHIPPED | OUTPATIENT
Start: 2025-02-28

## 2025-02-28 RX ORDER — MEPERIDINE HYDROCHLORIDE 25 MG/ML
25 INJECTION INTRAMUSCULAR; INTRAVENOUS; SUBCUTANEOUS EVERY 10 MIN PRN
Status: DISCONTINUED | OUTPATIENT
Start: 2025-02-28 | End: 2025-02-28 | Stop reason: HOSPADM

## 2025-02-28 RX ORDER — ONDANSETRON 4 MG/1
8 TABLET, ORALLY DISINTEGRATING ORAL EVERY 8 HOURS PRN
Status: DISCONTINUED | OUTPATIENT
Start: 2025-02-28 | End: 2025-02-28 | Stop reason: HOSPADM

## 2025-02-28 RX ORDER — IPRATROPIUM BROMIDE AND ALBUTEROL SULFATE 2.5; .5 MG/3ML; MG/3ML
3 SOLUTION RESPIRATORY (INHALATION) ONCE
Status: DISCONTINUED | OUTPATIENT
Start: 2025-02-28 | End: 2025-02-28 | Stop reason: HOSPADM

## 2025-02-28 RX ORDER — DEXAMETHASONE SODIUM PHOSPHATE 4 MG/ML
INJECTION, SOLUTION INTRA-ARTICULAR; INTRALESIONAL; INTRAMUSCULAR; INTRAVENOUS; SOFT TISSUE
Status: DISCONTINUED | OUTPATIENT
Start: 2025-02-28 | End: 2025-02-28

## 2025-02-28 RX ORDER — MUPIROCIN 20 MG/G
OINTMENT TOPICAL 2 TIMES DAILY
Status: DISCONTINUED | OUTPATIENT
Start: 2025-02-28 | End: 2025-02-28 | Stop reason: HOSPADM

## 2025-02-28 RX ORDER — OXYCODONE AND ACETAMINOPHEN 5; 325 MG/1; MG/1
1 TABLET ORAL EVERY 4 HOURS PRN
Status: DISCONTINUED | OUTPATIENT
Start: 2025-02-28 | End: 2025-02-28 | Stop reason: HOSPADM

## 2025-02-28 RX ORDER — SODIUM CHLORIDE 9 MG/ML
INJECTION, SOLUTION INTRAVENOUS CONTINUOUS
Status: DISCONTINUED | OUTPATIENT
Start: 2025-02-28 | End: 2025-02-28 | Stop reason: HOSPADM

## 2025-02-28 RX ORDER — HYDROMORPHONE HYDROCHLORIDE 2 MG/ML
0.5 INJECTION, SOLUTION INTRAMUSCULAR; INTRAVENOUS; SUBCUTANEOUS EVERY 5 MIN PRN
Status: COMPLETED | OUTPATIENT
Start: 2025-02-28 | End: 2025-02-28

## 2025-02-28 RX ORDER — ACETAMINOPHEN 10 MG/ML
1000 INJECTION, SOLUTION INTRAVENOUS ONCE
Status: COMPLETED | OUTPATIENT
Start: 2025-02-28 | End: 2025-02-28

## 2025-02-28 RX ORDER — ONDANSETRON 4 MG/1
4 TABLET, ORALLY DISINTEGRATING ORAL EVERY 6 HOURS PRN
Qty: 30 TABLET | Refills: 0 | Status: SHIPPED | OUTPATIENT
Start: 2025-02-28

## 2025-02-28 RX ORDER — CEFAZOLIN SODIUM 1 G/3ML
INJECTION, POWDER, FOR SOLUTION INTRAMUSCULAR; INTRAVENOUS
Status: DISCONTINUED | OUTPATIENT
Start: 2025-02-28 | End: 2025-02-28

## 2025-02-28 RX ORDER — DIPHENHYDRAMINE HYDROCHLORIDE 50 MG/ML
25 INJECTION INTRAMUSCULAR; INTRAVENOUS EVERY 6 HOURS PRN
Status: DISCONTINUED | OUTPATIENT
Start: 2025-02-28 | End: 2025-02-28 | Stop reason: HOSPADM

## 2025-02-28 RX ORDER — PROPOFOL 10 MG/ML
VIAL (ML) INTRAVENOUS
Status: DISCONTINUED | OUTPATIENT
Start: 2025-02-28 | End: 2025-02-28

## 2025-02-28 RX ORDER — TRANEXAMIC ACID 100 MG/ML
INJECTION, SOLUTION INTRAVENOUS
Status: DISCONTINUED | OUTPATIENT
Start: 2025-02-28 | End: 2025-02-28

## 2025-02-28 RX ADMIN — ACETAMINOPHEN 1000 MG: 10 INJECTION, SOLUTION INTRAVENOUS at 12:02

## 2025-02-28 RX ADMIN — HYDROMORPHONE HYDROCHLORIDE 0.5 MG: 2 INJECTION, SOLUTION INTRAMUSCULAR; INTRAVENOUS; SUBCUTANEOUS at 12:02

## 2025-02-28 RX ADMIN — OXYCODONE 10 MG: 5 TABLET ORAL at 01:02

## 2025-02-28 RX ADMIN — DEXMEDETOMIDINE 5 MCG: 100 INJECTION, SOLUTION INTRAVENOUS at 11:02

## 2025-02-28 RX ADMIN — PROPOFOL 150 MG: 10 INJECTION, EMULSION INTRAVENOUS at 10:02

## 2025-02-28 RX ADMIN — SODIUM CHLORIDE: 9 INJECTION, SOLUTION INTRAVENOUS at 09:02

## 2025-02-28 RX ADMIN — TRANEXAMIC ACID 1000 MG: 100 INJECTION INTRAVENOUS at 11:02

## 2025-02-28 RX ADMIN — FENTANYL CITRATE 50 MCG: 50 INJECTION, SOLUTION INTRAMUSCULAR; INTRAVENOUS at 10:02

## 2025-02-28 RX ADMIN — DEXAMETHASONE SODIUM PHOSPHATE 8 MG: 4 INJECTION, SOLUTION INTRA-ARTICULAR; INTRALESIONAL; INTRAMUSCULAR; INTRAVENOUS; SOFT TISSUE at 10:02

## 2025-02-28 RX ADMIN — ONDANSETRON 4 MG: 2 INJECTION INTRAMUSCULAR; INTRAVENOUS at 10:02

## 2025-02-28 RX ADMIN — LIDOCAINE HYDROCHLORIDE 100 MG: 20 INJECTION, SOLUTION EPIDURAL; INFILTRATION; INTRACAUDAL; PERINEURAL at 10:02

## 2025-02-28 RX ADMIN — FENTANYL CITRATE 50 MCG: 50 INJECTION, SOLUTION INTRAMUSCULAR; INTRAVENOUS at 11:02

## 2025-02-28 RX ADMIN — MIDAZOLAM HYDROCHLORIDE 2 MG: 1 INJECTION, SOLUTION INTRAMUSCULAR; INTRAVENOUS at 10:02

## 2025-02-28 RX ADMIN — CEFAZOLIN 2 G: 330 INJECTION, POWDER, FOR SOLUTION INTRAMUSCULAR; INTRAVENOUS at 10:02

## 2025-02-28 NOTE — OR NURSING
"1151- patient arrived in pacu. Vss, respirations even et unlabored. No signs of pain or distress. Pt has dressing on right arm. No signs of bleeding or drainage. Pt drowsy.       1202- Pt more awake. Vss, respirations even et unlabroed. Dressing c/d/I. No signs of bleeding or drainage. Pt aaox4. Pt complaining of pain. Dose of pain medication administered. See flow sheet.    1205- Ofirmev IV started.    1215- Pt aaox4. Chatting with nursing staff. Vss, respirations even et unlabored. Dressing on right arm c/d/I. Pt c/o pain. Additional dose of pain medication administered. See flow sheet.    1225 - Pt aaox4. Chatting with nursing staff. Vss, respirations even et unlabored. Dressing on right arm c/d/I. Pt continues to c/o pain in elbow. Additional dose of pain medication administered. See flow sheet.    1230 - Pt aaox4. Chatting with nursing staff. Vss, respirations even et unlabored. Dressing on right arm c/d/I. Pt continues to c/o pain in elbow. Additional dose of pain medication administered. See flow sheet.    1250- Pt resting more comfortably. States pain is "getting a bit better".  Vss, respirations even et unlabored. Dressing c/d/I. Pt transported to [facility] by stretcher.    1255- Report given to Ashely Cervantes RN. VS: 141/86, hr 80, rr 16, o2 84.  "

## 2025-02-28 NOTE — H&P
[facility]  Orthopedics  H&P    Patient Name: Davi Talamantes  MRN: [id number]  Admission Date: 2025  Primary Care Provider: Thomas Bradshaw DO    Patient information was obtained from patient and ER records.     Subjective:     Principal Problem:<principal problem not specified>    Chief Complaint: No chief complaint on file.       HPI:  Here today for right carpal tunnel and cubital tunnel release    Past Medical History:   Diagnosis Date    Diabetes mellitus, type 2     Hypertension        Past Surgical History:   Procedure Laterality Date    TONSILLECTOMY  1972    TRIGGER FINGER RELEASE Right 4/10/2024    Procedure: RELEASE, TRIGGER FINGER;  Surgeon: Nick Asif MD;  Location: [facility] OR;  Service: Orthopedics;  Laterality: Right;  RING    TRIGGER FINGER RELEASE Right 2024    Procedure: RELEASE, TRIGGER FINGER;  Surgeon: Nick Asif MD;  Location: [facility] OR;  Service: Orthopedics;  Laterality: Right;  RING       Review of patient's allergies indicates:  No Known Allergies    Current Facility-Administered Medications   Medication    0.9% NaCl infusion     Family History       Problem Relation (Age of Onset)    Alcohol abuse Maternal Grandfather    Arthritis Father    Asthma Daughter    Birth defects Daughter    Cancer Mother, Sister    Depression Father    Diabetes Mother, Sister    Early death Daughter    Heart disease Maternal Grandmother    Intellectual disability Daughter    Learning disabilities Daughter    Stroke Mother, Maternal Grandmother          Tobacco Use    Smoking status: Former     Current packs/day: 0.00     Types: Cigarettes     Start date: 10/18/2002     Quit date: 2004     Years since quittin.7     Passive exposure: Never    Smokeless tobacco: Never    Tobacco comments:     Crutch when wife . Stopped cold turkey   Substance and Sexual Activity    Alcohol use: Not Currently    Drug use: Never    Sexual activity: Not  "Currently     Partners: Female, Male     Birth control/protection: Abstinence, Condom     Review of Systems   Constitutional: Negative for chills and decreased appetite.   Cardiovascular:  Negative for chest pain and palpitations.   Respiratory:  Negative for cough and shortness of breath.    Gastrointestinal:  Negative for abdominal pain.   Neurological:  Negative for focal weakness, numbness, sensory change and weakness.   Psychiatric/Behavioral:  Negative for altered mental status.      Objective:     Vital Signs (Most Recent):  Temp: 98.3 °F (36.8 °C) (02/28/25 0906)  Pulse: 76 (02/28/25 0906)  Resp: 16 (02/28/25 0906)  BP: (!) 169/95 (02/28/25 0907)  SpO2: 98 % (02/28/25 0906) Vital Signs (24h Range):  Temp:  [98.3 °F (36.8 °C)] 98.3 °F (36.8 °C)  Pulse:  [76] 76  Resp:  [16] 16  SpO2:  [98 %] 98 %  BP: (167-169)/(95) 169/95     Weight: 115.7 kg (255 lb)  Height: 5' 9" (175.3 cm)  Body mass index is 37.66 kg/m².    No intake or output data in the 24 hours ending 02/28/25 0908    General    Nursing note and vitals reviewed.  Constitutional: He is oriented to person, place, and time. He appears well-developed and well-nourished.   HENT:   Head: Normocephalic and atraumatic.   Nose: Nose normal.   Eyes: EOM are normal. Pupils are equal, round, and reactive to light.   Neck: Neck supple.   Cardiovascular:  Normal rate and intact distal pulses.            Pulmonary/Chest: Effort normal. No respiratory distress. He exhibits no tenderness.   Abdominal: Soft. He exhibits no distension. There is no abdominal tenderness.   Neurological: He is alert and oriented to person, place, and time. He has normal reflexes.   Psychiatric: He has a normal mood and affect. His behavior is normal. Judgment and thought content normal.             Right Hand/Wrist Exam     Inspection   Scars: Wrist - absent   Effusion: Wrist - absent   Bruising: Wrist - absent   Deformity: Wrist - deformity     Tests   Phalens sign: positive  Tinel's " sign (median nerve): positive  Finkelstein's test: negative  Carpal Tunnel Compression Test: positive  Cubital Tunnel Compression Test: positive  LT Ballottment: negative    Atrophy   Thenar:  negative  Intrinsic:  negative    Other     Neuorologic Exam    Median Distribution: abnormal  Ulnar Distribution: normal  Radial Distribution: normal      Right Elbow Exam     Range of Motion   Extension:  normal   Flexion:  normal   Supination:  normal     Tests   Tinel's sign (cubital tunnel): positive  Tennis Elbow: negative  Golfer's Elbow: negative  Radial Capitellar Grind: negative    Other   Sensation: normal          Muscle Strength   Right Upper Extremity   Wrist extension: 5/5   Wrist flexion: 5/5   : 4/5   Pinch Mechanism: 5/5  Intrinsics: 4/5    Vascular Exam       Capillary Refill  Right Hand: normal capillary refill          Significant Labs:   Recent Lab Results       None          All pertinent labs within the past 24 hours have been reviewed.    Significant Imaging: I have reviewed and interpreted all pertinent imaging results/findings.        Assessment/Plan:     There are no hospital problems to display for this patient.        Nick Asif MD  Orthopedics  [facility] - Orthopedic Periop Services

## 2025-02-28 NOTE — OP NOTE
[facility] - Orthopedic Periop Services  Operative Note    Surgery Date: 2/28/2025      Surgeon(s) and Role:     * Nick Asif MD - Primary    Assistant: Juan José Keenan    Pre-op Diagnosis:   Cubital tunnel syndrome on right [G56.21]  Carpal tunnel syndrome on right [G56.01]     Post-op Diagnosis:  Post-Op Diagnosis Codes:     * Cubital tunnel syndrome on right [G56.21]     * Carpal tunnel syndrome on right [G56.01]     Procedure:  Procedure(s) (LRB):  RELEASE, CARPAL TUNNEL (Right)  RELEASE, CUBITAL TUNNEL (Right)   ([id number], [id number])  Anesthesia:  LMA    EBL:  5 cc    Implants: none    Tourniquet time: 12 mins    Complication:   none    Procedure: The patient was taken to the operating room and placedsupine.  Anesthesia was administered and pre-operative antibiotics were given.  A timeout was performed.  Patient was positioned appropriately and prepped and draped in the usual sterile fashion.      After adequate general anesthesia was obtained, a well-padded tourniquet was placed on the patient's right upper arm. The operative upper extremity was then prepped and draped in the usual sterile fashion. Planned skin incision was marked along the base of the patient's palm. The upper extremity was then exsanguinated by esmarch. The tourniquet was then inflated to 250 mmHg. Skin incision was then made and dissection was carried down with scalpel to the level of the palmar fascia which was sharply divided by the skin incision. Bleeding points were identified with electrocautery using electrocautery. Retractors were then placed to allow visualization of the distal extent of the transverse carpal ligament, and this was then divided longitudinally under direct vision. Metzenbaum scissors were used to dissect distal to this area to confirm the absence of any remaining crossing obstructing fibrous band. Retractors were then replaced proximally to allow visualization of proximal extent of the transverse carpal ligament and the release  was continued proximally until complete release was performed. A skid and a corresponding knife blade were utilized. No other abnormalities were noted. Wounds were then irrigated with normal saline. Skin incision was then closed with interrupted 4-0 nylon suture.     We turned our attention towards the cubital tunnel release.  The medial epicondyle was palpated and a 6 cm incision was made just proximal and just distal to the medial epicondyle.  Dissection was carried through the subcutaneous tissues.  The ulnar nerve was identified just proximal to the medial epicondyle and released 1st from distal to proximal.  This served to release the arcade of [place].  The dissection was then carried distally between Wild's ligament and the MCL.  This was carefully released all the way down to the level of the flexor carpi ulnaris and the aponeurosis.  The nerve was felt to be particularly constricted at the level of the flexor carpi ulnaris.  Excellent release was able to be achieved and then the tourniquet was released  Bleeders were coagulated and a layered closure performed consisting of 2-0 Vicryl and 3-0 Monocryl in a running subcuticular fashion.  The patient was then awakened, extubated, and transferred over to his hospital bed. The patient was transported to recovery room in stable condition. There were no intraoperative or immediate postoperative complications. All counts were reported as correct.     We will be in a sling okay for gentle range of motion of the hand wrist and elbow    Disposition:awakened from anesthesia, extubated and taken to the recovery room in a stable condition, having suffered no apparent untoward event.

## 2025-02-28 NOTE — TRANSFER OF CARE
"Anesthesia Transfer of Care Note    Patient: Davi Talamantes    Procedure(s) Performed: Procedure(s) (LRB):  RELEASE, CARPAL TUNNEL (Right)  RELEASE, CUBITAL TUNNEL (Right)    Patient location: PACU    Anesthesia Type: general    Transport from OR: Transported from OR on 6-10 L/min O2 by face mask with adequate spontaneous ventilation    Post pain: adequate analgesia    Post assessment: no apparent anesthetic complications    Post vital signs: stable    Level of consciousness: responds to stimulation and sedated    Nausea/Vomiting: no nausea/vomiting    Complications: none    Transfer of care protocol was followed      Last vitals: Visit Vitals  /76   Pulse 96   Temp 36.8 °C (98.2 °F) (Oral)   Resp 12   Ht 5' 9" (1.753 m)   Wt 115.7 kg (255 lb)   SpO2 95%   BMI 37.66 kg/m²     "

## 2025-02-28 NOTE — ANESTHESIA PROCEDURE NOTES
Intubation    Date/Time: 2/28/2025 10:48 AM    Performed by: Kemi Botello CRNA  Authorized by: Petar Duncan MD    Intubation:     Induction:  Intravenous    Mask Ventilation:  Not attempted    Attempts:  1    Attempted By:  MIKHAIL (jovan kennedy)    Difficult Airway Encountered?: No      Complications:  None    Airway Device:  Supraglottic airway/LMA    Airway Device Size:  4.0    Placement Verified By:  Capnometry    Complicating Factors:  None    Findings Post-Intubation:  BS equal bilateral

## 2025-02-28 NOTE — ANESTHESIA PREPROCEDURE EVALUATION
02/28/2025  Davi Talamantes is a 59 y.o., male.      Pre-op Assessment    I have reviewed the Patient Summary Reports.     I have reviewed the Nursing Notes. I have reviewed the NPO Status.   I have reviewed the Medications.     Review of Systems  Anesthesia Hx:             Denies Family Hx of Anesthesia complications.    Denies Personal Hx of Anesthesia complications.                    Social:  Non-Smoker, No Alcohol Use       Cardiovascular:     Hypertension                                          Pulmonary:        Sleep Apnea                Musculoskeletal:  Musculoskeletal Normal                Endocrine:  Diabetes         Obesity / BMI > 30      Chemistry        Component Value Date/Time     02/25/2025 0842    K 4.3 02/25/2025 0842     02/25/2025 0842    CO2 25 02/25/2025 0842    BUN 12 02/25/2025 0842    CREATININE 1.06 02/25/2025 0842     (H) 02/25/2025 0842        Component Value Date/Time    CALCIUM 8.9 02/25/2025 0842    ALKPHOS 70 03/05/2024 1134    AST 20 03/05/2024 1134    ALT 37 03/05/2024 1134    BILITOT 0.3 03/05/2024 1134        Lab Results   Component Value Date    WBC 7.46 03/05/2024    HGB 14.9 03/05/2024    HCT 44.3 03/05/2024     03/05/2024     Results for orders placed or performed in visit on 04/05/24   EKG 12-lead    Collection Time: 04/05/24  8:28 AM   Result Value Ref Range    QRS Duration 86 ms    OHS QTC Calculation 448 ms    Narrative    Test Reason : Z01.818,    Vent. Rate : 081 BPM     Atrial Rate : 081 BPM     P-R Int : 144 ms          QRS Dur : 086 ms      QT Int : 386 ms       P-R-T Axes : 028 -57 005 degrees     QTc Int : 448 ms    Normal sinus rhythm  Left anterior fascicular block  Minimal voltage criteria for LVH, may be normal variant ( R in aVL )  Abnormal ECG  No previous ECGs available  Confirmed by Kevin Pagan MD ([id number]) on 5/21/2024 6:30:50  AM    Referred By: GONSALO AYON           Confirmed By:Kevin Pagan MD            Anesthesia Plan  Type of Anesthesia, risks & benefits discussed:    Anesthesia Type: Gen Supraglottic Airway  Intra-op Monitoring Plan: Standard ASA Monitors  Post Op Pain Control Plan: multimodal analgesia  Induction:  IV  Airway Plan: Direct  Informed Consent: Informed consent signed with the Patient and all parties understand the risks and agree with anesthesia plan.  All questions answered.   ASA Score: 3  Day of Surgery Review of History & Physical: H&P Update referred to the surgeon/provider.I have interviewed and examined the patient. I have reviewed the patient's H&P dated: There are no significant changes.     Ready For Surgery From Anesthesia Perspective.     .

## 2025-03-03 NOTE — ANESTHESIA POSTPROCEDURE EVALUATION
Anesthesia Post Evaluation    Patient: Davi Talamantes    Procedure(s) Performed: Procedure(s) (LRB):  RELEASE, CARPAL TUNNEL (Right)  RELEASE, CUBITAL TUNNEL (Right)    Final Anesthesia Type: general      Patient location during evaluation: PACU  Patient participation: Yes- Able to Participate  Level of consciousness: awake and alert  Post-procedure vital signs: reviewed and stable  Pain management: adequate  Airway patency: patent  LYNNE mitigation strategies: Multimodal analgesia  PONV status at discharge: No PONV  Anesthetic complications: no      Cardiovascular status: blood pressure returned to baseline  Respiratory status: unassisted  Hydration status: euvolemic  Follow-up not needed.              Vitals Value Taken Time   /80 02/28/25 13:31   Temp 36.8 °C (98.2 °F) 02/28/25 11:53   Pulse 73 02/28/25 13:45   Resp 16 02/28/25 13:45   SpO2 94 % 02/28/25 13:45         Event Time   Out of Recovery 12:50:00         Pain/Chevy Score: No data recorded

## 2025-06-20 ENCOUNTER — PATIENT MESSAGE (OUTPATIENT)
Dept: FAMILY MEDICINE | Facility: CLINIC | Age: 60
End: 2025-06-20
Payer: COMMERCIAL

## (undated) DEVICE — TOURNIQUET SB QC SP 18X4IN

## (undated) DEVICE — GLOVE SENSICARE PI SURG 6.5

## (undated) DEVICE — GLOVE SENSICARE PI GRN 7.5

## (undated) DEVICE — SPONGE COTTON TRAY 4X4IN

## (undated) DEVICE — SYR 10CC LUER LOCK

## (undated) DEVICE — ADHESIVE MASTISOL VIAL 48/BX

## (undated) DEVICE — GLOVE SENSICARE PI GRN 7

## (undated) DEVICE — APPLICATOR CHLORAPREP ORN 26ML

## (undated) DEVICE — SOCKINETTE DOUBLE PLY 4X48IN

## (undated) DEVICE — DRAPE U SPLIT SHEET 54X76IN

## (undated) DEVICE — CLOSURE SKIN STERI STRIP 1/2X4

## (undated) DEVICE — DRAPE INVISISHIELD U 48X52IN

## (undated) DEVICE — LOOP MAXI RED SILICONE PK/2

## (undated) DEVICE — BANDAGE ESMARK 4INX3YD

## (undated) DEVICE — Device

## (undated) DEVICE — PAD CAST SPECIALIST STRL 3

## (undated) DEVICE — NDL HYPODERMIC SAFETY 25G 1IN

## (undated) DEVICE — GLOVE SENSICARE PI SURG 7.5

## (undated) DEVICE — SUT ETHILON 4-0 PS2 18 BLK

## (undated) DEVICE — GLOVE SENSICARE PI SURG 7

## (undated) DEVICE — PAD SUREFIT GRND ELECTRD 10FT

## (undated) DEVICE — SUT MONOCRYL 3-0 PS-2 UND

## (undated) DEVICE — GLOVE SENSICARE PI GRN 6

## (undated) DEVICE — GLOVE SENSICARE PI GRN 6.5

## (undated) DEVICE — DRESSING XEROFORM NONADH 1X8IN

## (undated) DEVICE — SOL NACL IRR 1000ML BTL

## (undated) DEVICE — SLING ARM LARGE FOAM STRAP

## (undated) DEVICE — SUT VICRYL PLUS 4-0 FS-2 27IN

## (undated) DEVICE — BANDAGE MATRIX HK LOOP 2IN 5YD

## (undated) DEVICE — PADDING WYTEX UNDRCST 2INX4YD

## (undated) DEVICE — SUT 4-0 VICRYL / FS-2

## (undated) DEVICE — KNIFE CARPAL TUNNEL

## (undated) DEVICE — BLADE SURG #15 CARBON STEEL

## (undated) DEVICE — PENCIL ELECTROSURG HOLST W/BLD

## (undated) DEVICE — GOWN POLY REINF BRTH SLV XL

## (undated) DEVICE — SUT ETHILON 3-0 PS2 18 BLK

## (undated) DEVICE — BANDAGE ACE NON LATEX 2IN

## (undated) DEVICE — SUT 2-0 VICRYL / CT-1

## (undated) DEVICE — GLOVE SENSICARE PI GRN 8

## (undated) DEVICE — NEEDLE HYPO PRO 22G X 1IN

## (undated) DEVICE — GLOVE SENSICARE PI SURG 6

## (undated) DEVICE — PENCIL SMK EVAC CONNECTOR 10FT